# Patient Record
Sex: FEMALE | URBAN - METROPOLITAN AREA
[De-identification: names, ages, dates, MRNs, and addresses within clinical notes are randomized per-mention and may not be internally consistent; named-entity substitution may affect disease eponyms.]

---

## 2017-08-30 ENCOUNTER — INPATIENT (INPATIENT)
Facility: HOSPITAL | Age: 82
LOS: 7 days | Discharge: NURSING, REHAB OR LONG-TERM CARE | End: 2017-09-07
Attending: INTERNAL MEDICINE | Admitting: INTERNAL MEDICINE
Payer: MEDICARE

## 2017-08-30 VITALS
SYSTOLIC BLOOD PRESSURE: 156 MMHG | RESPIRATION RATE: 20 BRPM | DIASTOLIC BLOOD PRESSURE: 69 MMHG | HEART RATE: 103 BPM | TEMPERATURE: 99 F | OXYGEN SATURATION: 99 %

## 2017-08-30 DIAGNOSIS — E03.9 HYPOTHYROIDISM, UNSPECIFIED: ICD-10-CM

## 2017-08-30 DIAGNOSIS — S42.90XA FRACTURE OF UNSPECIFIED SHOULDER GIRDLE, PART UNSPECIFIED, INITIAL ENCOUNTER FOR CLOSED FRACTURE: Chronic | ICD-10-CM

## 2017-08-30 DIAGNOSIS — E89.0 POSTPROCEDURAL HYPOTHYROIDISM: Chronic | ICD-10-CM

## 2017-08-30 DIAGNOSIS — D64.9 ANEMIA, UNSPECIFIED: ICD-10-CM

## 2017-08-30 DIAGNOSIS — K21.9 GASTRO-ESOPHAGEAL REFLUX DISEASE WITHOUT ESOPHAGITIS: ICD-10-CM

## 2017-08-30 DIAGNOSIS — H40.9 UNSPECIFIED GLAUCOMA: ICD-10-CM

## 2017-08-30 DIAGNOSIS — R60.0 LOCALIZED EDEMA: ICD-10-CM

## 2017-08-30 DIAGNOSIS — H26.40 UNSPECIFIED SECONDARY CATARACT: Chronic | ICD-10-CM

## 2017-08-30 DIAGNOSIS — Z29.9 ENCOUNTER FOR PROPHYLACTIC MEASURES, UNSPECIFIED: ICD-10-CM

## 2017-08-30 DIAGNOSIS — E78.5 HYPERLIPIDEMIA, UNSPECIFIED: ICD-10-CM

## 2017-08-30 DIAGNOSIS — I10 ESSENTIAL (PRIMARY) HYPERTENSION: ICD-10-CM

## 2017-08-30 LAB
ABO RH CONFIRMATION: SIGNIFICANT CHANGE UP
ALBUMIN SERPL ELPH-MCNC: 3.4 G/DL — SIGNIFICANT CHANGE UP (ref 3.3–5)
ALP SERPL-CCNC: 91 U/L — SIGNIFICANT CHANGE UP (ref 40–120)
ALT FLD-CCNC: 11 U/L — LOW (ref 12–78)
ANION GAP SERPL CALC-SCNC: 10 MMOL/L — SIGNIFICANT CHANGE UP (ref 5–17)
ANISOCYTOSIS BLD QL: SIGNIFICANT CHANGE UP
APPEARANCE UR: CLEAR — SIGNIFICANT CHANGE UP
APTT BLD: 24.5 SEC — LOW (ref 27.5–37.4)
AST SERPL-CCNC: 17 U/L — SIGNIFICANT CHANGE UP (ref 15–37)
BILIRUB SERPL-MCNC: 1.2 MG/DL — SIGNIFICANT CHANGE UP (ref 0.2–1.2)
BILIRUB UR-MCNC: NEGATIVE — SIGNIFICANT CHANGE UP
BLD GP AB SCN SERPL QL: SIGNIFICANT CHANGE UP
BUN SERPL-MCNC: 12 MG/DL — SIGNIFICANT CHANGE UP (ref 7–23)
CALCIUM SERPL-MCNC: 8.5 MG/DL — SIGNIFICANT CHANGE UP (ref 8.5–10.1)
CHLORIDE SERPL-SCNC: 108 MMOL/L — SIGNIFICANT CHANGE UP (ref 96–108)
CK SERPL-CCNC: 100 U/L — SIGNIFICANT CHANGE UP (ref 26–192)
CO2 SERPL-SCNC: 25 MMOL/L — SIGNIFICANT CHANGE UP (ref 22–31)
COLOR SPEC: YELLOW — SIGNIFICANT CHANGE UP
CREAT SERPL-MCNC: 0.52 MG/DL — SIGNIFICANT CHANGE UP (ref 0.5–1.3)
DACRYOCYTES BLD QL SMEAR: SLIGHT — SIGNIFICANT CHANGE UP
DIFF PNL FLD: NEGATIVE — SIGNIFICANT CHANGE UP
EOSINOPHIL NFR BLD AUTO: 1 % — SIGNIFICANT CHANGE UP (ref 0–6)
GIANT PLATELETS BLD QL SMEAR: PRESENT — SIGNIFICANT CHANGE UP
GLUCOSE SERPL-MCNC: 143 MG/DL — HIGH (ref 70–99)
GLUCOSE UR QL: NEGATIVE MG/DL — SIGNIFICANT CHANGE UP
HCT VFR BLD CALC: 15.7 % — CRITICAL LOW (ref 34.5–45)
HCT VFR BLD CALC: 16.7 % — CRITICAL LOW (ref 34.5–45)
HGB BLD-MCNC: 4.3 G/DL — CRITICAL LOW (ref 11.5–15.5)
HGB BLD-MCNC: 4.6 G/DL — CRITICAL LOW (ref 11.5–15.5)
HYPOCHROMIA BLD QL: SIGNIFICANT CHANGE UP
INR BLD: 1.19 RATIO — HIGH (ref 0.88–1.16)
KETONES UR-MCNC: ABNORMAL
LEUKOCYTE ESTERASE UR-ACNC: NEGATIVE — SIGNIFICANT CHANGE UP
LYMPHOCYTES # BLD AUTO: 5 % — LOW (ref 13–44)
MACROCYTES BLD QL: SLIGHT — SIGNIFICANT CHANGE UP
MAGNESIUM SERPL-MCNC: 2.1 MG/DL — SIGNIFICANT CHANGE UP (ref 1.6–2.6)
MCHC RBC-ENTMCNC: 17 PG — LOW (ref 27–34)
MCHC RBC-ENTMCNC: 17 PG — LOW (ref 27–34)
MCHC RBC-ENTMCNC: 27 GM/DL — LOW (ref 32–36)
MCHC RBC-ENTMCNC: 27.4 GM/DL — LOW (ref 32–36)
MCV RBC AUTO: 62.2 FL — LOW (ref 80–100)
MCV RBC AUTO: 62.7 FL — LOW (ref 80–100)
MICROCYTES BLD QL: SIGNIFICANT CHANGE UP
MONOCYTES NFR BLD AUTO: 3 % — SIGNIFICANT CHANGE UP (ref 2–14)
MYOGLOBIN SERPL-MCNC: 55 NG/ML — SIGNIFICANT CHANGE UP (ref 9–82)
NEUTROPHILS NFR BLD AUTO: 91 % — HIGH (ref 43–77)
NITRITE UR-MCNC: NEGATIVE — SIGNIFICANT CHANGE UP
OB PNL STL: NEGATIVE — SIGNIFICANT CHANGE UP
OVALOCYTES BLD QL SMEAR: SIGNIFICANT CHANGE UP
PH UR: 6 — SIGNIFICANT CHANGE UP (ref 5–8)
PLAT MORPH BLD: NORMAL — SIGNIFICANT CHANGE UP
PLATELET # BLD AUTO: 374 K/UL — SIGNIFICANT CHANGE UP (ref 150–400)
PLATELET # BLD AUTO: 397 K/UL — SIGNIFICANT CHANGE UP (ref 150–400)
POIKILOCYTOSIS BLD QL AUTO: SLIGHT — SIGNIFICANT CHANGE UP
POLYCHROMASIA BLD QL SMEAR: SLIGHT — SIGNIFICANT CHANGE UP
POTASSIUM SERPL-MCNC: 4.2 MMOL/L — SIGNIFICANT CHANGE UP (ref 3.5–5.3)
POTASSIUM SERPL-SCNC: 4.2 MMOL/L — SIGNIFICANT CHANGE UP (ref 3.5–5.3)
PROT SERPL-MCNC: 6.6 GM/DL — SIGNIFICANT CHANGE UP (ref 6–8.3)
PROT UR-MCNC: NEGATIVE MG/DL — SIGNIFICANT CHANGE UP
PROTHROM AB SERPL-ACNC: 13 SEC — HIGH (ref 9.8–12.7)
RBC # BLD: 2.51 M/UL — LOW (ref 3.8–5.2)
RBC # BLD: 2.68 M/UL — LOW (ref 3.8–5.2)
RBC # FLD: 16.1 % — HIGH (ref 11–15)
RBC # FLD: 16.6 % — HIGH (ref 11–15)
RBC BLD AUTO: ABNORMAL
SODIUM SERPL-SCNC: 143 MMOL/L — SIGNIFICANT CHANGE UP (ref 135–145)
SP GR SPEC: 1.02 — SIGNIFICANT CHANGE UP (ref 1.01–1.02)
TARGETS BLD QL SMEAR: SLIGHT — SIGNIFICANT CHANGE UP
UROBILINOGEN FLD QL: NEGATIVE MG/DL — SIGNIFICANT CHANGE UP
WBC # BLD: 10 K/UL — SIGNIFICANT CHANGE UP (ref 3.8–10.5)
WBC # BLD: 9.4 K/UL — SIGNIFICANT CHANGE UP (ref 3.8–10.5)
WBC # FLD AUTO: 10 K/UL — SIGNIFICANT CHANGE UP (ref 3.8–10.5)
WBC # FLD AUTO: 9.4 K/UL — SIGNIFICANT CHANGE UP (ref 3.8–10.5)

## 2017-08-30 PROCEDURE — 73562 X-RAY EXAM OF KNEE 3: CPT | Mod: 26,LT

## 2017-08-30 PROCEDURE — 70450 CT HEAD/BRAIN W/O DYE: CPT | Mod: 26

## 2017-08-30 PROCEDURE — 99223 1ST HOSP IP/OBS HIGH 75: CPT | Mod: AI

## 2017-08-30 PROCEDURE — 73502 X-RAY EXAM HIP UNI 2-3 VIEWS: CPT | Mod: 26,LT

## 2017-08-30 PROCEDURE — 71010: CPT | Mod: 26

## 2017-08-30 PROCEDURE — 99285 EMERGENCY DEPT VISIT HI MDM: CPT

## 2017-08-30 RX ORDER — DOCUSATE SODIUM 100 MG
100 CAPSULE ORAL
Qty: 0 | Refills: 0 | Status: DISCONTINUED | OUTPATIENT
Start: 2017-08-30 | End: 2017-09-07

## 2017-08-30 RX ORDER — AMLODIPINE BESYLATE 2.5 MG/1
10 TABLET ORAL DAILY
Qty: 0 | Refills: 0 | Status: DISCONTINUED | OUTPATIENT
Start: 2017-08-30 | End: 2017-09-07

## 2017-08-30 RX ORDER — ASPIRIN/CALCIUM CARB/MAGNESIUM 324 MG
81 TABLET ORAL DAILY
Qty: 0 | Refills: 0 | Status: DISCONTINUED | OUTPATIENT
Start: 2017-08-30 | End: 2017-09-05

## 2017-08-30 RX ORDER — MAGNESIUM HYDROXIDE 400 MG/1
30 TABLET, CHEWABLE ORAL AT BEDTIME
Qty: 0 | Refills: 0 | Status: DISCONTINUED | OUTPATIENT
Start: 2017-08-30 | End: 2017-09-07

## 2017-08-30 RX ORDER — ACETAMINOPHEN 500 MG
650 TABLET ORAL EVERY 6 HOURS
Qty: 0 | Refills: 0 | Status: DISCONTINUED | OUTPATIENT
Start: 2017-08-30 | End: 2017-09-07

## 2017-08-30 RX ORDER — PANTOPRAZOLE SODIUM 20 MG/1
40 TABLET, DELAYED RELEASE ORAL
Qty: 0 | Refills: 0 | Status: DISCONTINUED | OUTPATIENT
Start: 2017-08-30 | End: 2017-08-31

## 2017-08-30 RX ORDER — ACETAMINOPHEN 500 MG
975 TABLET ORAL ONCE
Qty: 0 | Refills: 0 | Status: COMPLETED | OUTPATIENT
Start: 2017-08-30 | End: 2017-08-30

## 2017-08-30 RX ORDER — SODIUM CHLORIDE 9 MG/ML
1000 INJECTION INTRAMUSCULAR; INTRAVENOUS; SUBCUTANEOUS
Qty: 0 | Refills: 0 | Status: DISCONTINUED | OUTPATIENT
Start: 2017-08-30 | End: 2017-09-02

## 2017-08-30 RX ORDER — FERROUS SULFATE 325(65) MG
325 TABLET ORAL
Qty: 0 | Refills: 0 | Status: DISCONTINUED | OUTPATIENT
Start: 2017-08-30 | End: 2017-09-07

## 2017-08-30 RX ORDER — SENNA PLUS 8.6 MG/1
2 TABLET ORAL AT BEDTIME
Qty: 0 | Refills: 0 | Status: DISCONTINUED | OUTPATIENT
Start: 2017-08-30 | End: 2017-09-07

## 2017-08-30 RX ORDER — DORZOLAMIDE HYDROCHLORIDE TIMOLOL MALEATE 20; 5 MG/ML; MG/ML
1 SOLUTION/ DROPS OPHTHALMIC
Qty: 0 | Refills: 0 | Status: DISCONTINUED | OUTPATIENT
Start: 2017-08-30 | End: 2017-09-07

## 2017-08-30 RX ORDER — CHOLECALCIFEROL (VITAMIN D3) 125 MCG
2000 CAPSULE ORAL DAILY
Qty: 0 | Refills: 0 | Status: DISCONTINUED | OUTPATIENT
Start: 2017-08-30 | End: 2017-09-07

## 2017-08-30 RX ORDER — ATORVASTATIN CALCIUM 80 MG/1
10 TABLET, FILM COATED ORAL AT BEDTIME
Qty: 0 | Refills: 0 | Status: DISCONTINUED | OUTPATIENT
Start: 2017-08-30 | End: 2017-09-07

## 2017-08-30 RX ORDER — LEVOTHYROXINE SODIUM 125 MCG
88 TABLET ORAL DAILY
Qty: 0 | Refills: 0 | Status: DISCONTINUED | OUTPATIENT
Start: 2017-08-30 | End: 2017-09-07

## 2017-08-30 RX ADMIN — SENNA PLUS 2 TABLET(S): 8.6 TABLET ORAL at 22:19

## 2017-08-30 RX ADMIN — ATORVASTATIN CALCIUM 10 MILLIGRAM(S): 80 TABLET, FILM COATED ORAL at 22:18

## 2017-08-30 RX ADMIN — Medication 975 MILLIGRAM(S): at 10:47

## 2017-08-30 RX ADMIN — Medication 650 MILLIGRAM(S): at 18:01

## 2017-08-30 NOTE — ED PROVIDER NOTE - MEDICAL DECISION MAKING DETAILS
Patient pw fall and down time of approx 10 hours. Patient pw fall and down time of approx 10 hours. ekg shows nsr without ischemic change. Patient pw fall and down time of approx 10 hours. ekg shows nsr without ischemic change. xrays negative for fx of the lle. ct reassuring. cbc shows anemia, will need transfusion of 2 units. unclear where the blood is going given neg hemoccult.

## 2017-08-30 NOTE — H&P ADULT - ATTENDING COMMENTS
agree with h/p, a/p, Pt p/w s/p fall, lying on floor over night, CPK normal, but with low h/h, fobt x 1 negative in ed, ordered for 2 PRBC by ED, will get GI consult/Dr. Fletcher, and doppler LE to r/o DVT.

## 2017-08-30 NOTE — ED PROVIDER NOTE - OBJECTIVE STATEMENT
Pertinent PMH/PSH/FHx/SHx and Review of Systems contained within:  89F hx.... pw left knee pain. last night at approximately 10pm patient fell while reaching for her walker. she hit her left knee on the floor but was unable to get up. her aide Pertinent PMH/PSH/FHx/SHx and Review of Systems contained within:  89F hx.... pw left knee pain. last night at approximately 10pm patient fell while reaching for her walker. she hit her left knee on the floor but was unable to get up. her aide found her this morning at 8am and called 911. patient denies ha, cp, sob  Fh and Sh not otherwise contributory  ROS otherwise negative

## 2017-08-30 NOTE — ED ADULT NURSE NOTE - OBJECTIVE STATEMENT
Patient received alert and orientedx4, hard of hearing , states she fell last night while trying to get up. c/o of pain to the left knee, seen swollen and boggy

## 2017-08-30 NOTE — H&P ADULT - NSHPPHYSICALEXAM_GEN_ALL_CORE
heent ncat   eomi pulils 3mm non reactive  no jvd no bruit  lungs clear  cvs -s1s2 rr no mrg  abd soft bs+ nt nd   ext lue rle strenght reduced   lle tender to palpation  lle wider than rle   lle edema 1+ pitting   dp pt positive b/l

## 2017-08-30 NOTE — ED ADULT TRIAGE NOTE - CHIEF COMPLAINT QUOTE
pt status post fall last night. unable to get her self off of the floor. pt complaining of left knee pain.

## 2017-08-30 NOTE — H&P ADULT - PMH
Anemia due to unknown mechanism    Glaucoma    HLD (hyperlipidemia)    HTN (hypertension)    HTN (hypertension)    Hypothyroid

## 2017-08-30 NOTE — ED PROVIDER NOTE - PHYSICAL EXAMINATION
Gen: Alert, NAD  Head: NC, AT   Eyes: PERRL, EOMI, normal lids/conjunctiva  ENT: normal hearing, patent oropharynx without erythema/exudate, uvula midline  Neck: supple, no tenderness, Trachea midline  Pulm: Bilateral BS, normal resp effort, no wheeze/stridor/retractions  CV: RRR, no M/R/G, 2+ radial and dp pulses bl, no edema  Abd: soft, NT/ND, +BS, no hepatosplenomegaly  Mskel: left knee joint tissue effusion.   Skin: no rash, no bruising   Neuro: AAOx3, no sensory/motor deficits, CN 2-12 intact

## 2017-08-30 NOTE — H&P ADULT - HISTORY OF PRESENT ILLNESS
89 yo female lives alone hha 7 days 10 hours / pmh of falls / anemia microcytic / htn / hld / glaucoma hypothyroidism poor historian. pt was found on floor in am by a, as she was alone and is a poor historian events leading up to her being on floor unknown .pt c/o l eft knee pain lle weakness swelling ongoing b/l knee pain ros np cough ,no dysuria , no sore throat , occasional palpitation's , + tinnitus , no fever no abd pain no nausea no vomiting. head ct negative l knee film severe djd , cxr interstitial lung changes no opacity noted , hp pelvis film no fracture . er labs signigificant for hgb hct 4.3/15.7 pt receiving 2 units in er

## 2017-08-30 NOTE — H&P ADULT - NSHPREVIEWOFSYSTEMS_GEN_ALL_CORE
no dizziness, no n/v/d , no sore throat , no dysuria, no brbpr ,   +tinnitus , + palpations ,  b/l knee pain l>r left , l shoulder pain .
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
## 2017-08-31 LAB
ANION GAP SERPL CALC-SCNC: 8 MMOL/L — SIGNIFICANT CHANGE UP (ref 5–17)
BUN SERPL-MCNC: 9 MG/DL — SIGNIFICANT CHANGE UP (ref 7–23)
CALCIUM SERPL-MCNC: 8 MG/DL — LOW (ref 8.5–10.1)
CHLORIDE SERPL-SCNC: 105 MMOL/L — SIGNIFICANT CHANGE UP (ref 96–108)
CO2 SERPL-SCNC: 27 MMOL/L — SIGNIFICANT CHANGE UP (ref 22–31)
CREAT SERPL-MCNC: 0.46 MG/DL — LOW (ref 0.5–1.3)
CULTURE RESULTS: NO GROWTH — SIGNIFICANT CHANGE UP
GLUCOSE SERPL-MCNC: 99 MG/DL — SIGNIFICANT CHANGE UP (ref 70–99)
HCT VFR BLD CALC: 23.3 % — LOW (ref 34.5–45)
HGB BLD-MCNC: 7.3 G/DL — LOW (ref 11.5–15.5)
MCHC RBC-ENTMCNC: 21.1 PG — LOW (ref 27–34)
MCHC RBC-ENTMCNC: 31.4 GM/DL — LOW (ref 32–36)
MCV RBC AUTO: 67.4 FL — LOW (ref 80–100)
PLATELET # BLD AUTO: 347 K/UL — SIGNIFICANT CHANGE UP (ref 150–400)
POTASSIUM SERPL-MCNC: 3.9 MMOL/L — SIGNIFICANT CHANGE UP (ref 3.5–5.3)
POTASSIUM SERPL-SCNC: 3.9 MMOL/L — SIGNIFICANT CHANGE UP (ref 3.5–5.3)
RBC # BLD: 3.46 M/UL — LOW (ref 3.8–5.2)
RBC # FLD: 22 % — HIGH (ref 11–15)
SODIUM SERPL-SCNC: 140 MMOL/L — SIGNIFICANT CHANGE UP (ref 135–145)
SPECIMEN SOURCE: SIGNIFICANT CHANGE UP
TSH SERPL-MCNC: 2.86 UU/ML — SIGNIFICANT CHANGE UP (ref 0.36–3.74)
WBC # BLD: 15.1 K/UL — HIGH (ref 3.8–10.5)
WBC # FLD AUTO: 15.1 K/UL — HIGH (ref 3.8–10.5)

## 2017-08-31 PROCEDURE — 99233 SBSQ HOSP IP/OBS HIGH 50: CPT

## 2017-08-31 PROCEDURE — 76377 3D RENDER W/INTRP POSTPROCES: CPT | Mod: 26

## 2017-08-31 PROCEDURE — 73700 CT LOWER EXTREMITY W/O DYE: CPT | Mod: 26,LT

## 2017-08-31 RX ORDER — PANTOPRAZOLE SODIUM 20 MG/1
40 TABLET, DELAYED RELEASE ORAL
Qty: 0 | Refills: 0 | Status: DISCONTINUED | OUTPATIENT
Start: 2017-08-31 | End: 2017-09-07

## 2017-08-31 RX ORDER — ACETAMINOPHEN 500 MG
650 TABLET ORAL EVERY 6 HOURS
Qty: 0 | Refills: 0 | Status: DISCONTINUED | OUTPATIENT
Start: 2017-08-31 | End: 2017-09-07

## 2017-08-31 RX ADMIN — DORZOLAMIDE HYDROCHLORIDE TIMOLOL MALEATE 1 DROP(S): 20; 5 SOLUTION/ DROPS OPHTHALMIC at 18:31

## 2017-08-31 RX ADMIN — Medication 325 MILLIGRAM(S): at 11:30

## 2017-08-31 RX ADMIN — DORZOLAMIDE HYDROCHLORIDE TIMOLOL MALEATE 1 DROP(S): 20; 5 SOLUTION/ DROPS OPHTHALMIC at 06:59

## 2017-08-31 RX ADMIN — Medication 2000 UNIT(S): at 11:30

## 2017-08-31 RX ADMIN — SENNA PLUS 2 TABLET(S): 8.6 TABLET ORAL at 21:58

## 2017-08-31 RX ADMIN — Medication 100 MILLIGRAM(S): at 18:31

## 2017-08-31 RX ADMIN — ATORVASTATIN CALCIUM 10 MILLIGRAM(S): 80 TABLET, FILM COATED ORAL at 21:58

## 2017-08-31 RX ADMIN — Medication 325 MILLIGRAM(S): at 18:31

## 2017-08-31 RX ADMIN — Medication 81 MILLIGRAM(S): at 11:30

## 2017-08-31 RX ADMIN — Medication 650 MILLIGRAM(S): at 04:07

## 2017-08-31 NOTE — PROGRESS NOTE ADULT - ASSESSMENT
89 y/o female lives alone at home with A  7 days x 10 hours with PMH of falls, HTN, HLD, Glaucoma, Hypothyroidism, dementia  was brought in when HHA found him on the floor in am, Pt poor historian and states that she fell from bed and was on floor all night, but also had bed sheet and pillow on the floor as per roberto, CPK was normal,  Pt also c/o left knee pain, LLE weakness and ongoing swelling ongoing, also with  b/l knee pain (chronic as per daughter left >right), Pt denies being on any NSAIDs  In ED w/u c/w severe iron def/microcytic anemia, cpk wnl, CTH negative for intracranial pathology.

## 2017-08-31 NOTE — PROGRESS NOTE ADULT - SUBJECTIVE AND OBJECTIVE BOX
CHIEF COMPLAINT/INTERVAL HISTORY:    Patient is a 89y old  Female who presents with a chief complaint of s/p unwitnessed fall (30 Aug 2017 16:34)      HPI:  89 yo female lives alone hha 7 days 10 hours / pmh of falls / anemia microcytic / htn / hld / glaucoma hypothyroidism poor historian. pt was found on floor in am by hha, as she was alone and is a poor historian events leading up to her being on floor unknown .pt c/o l eft knee pain lle weakness swelling ongoing b/l knee pain ros np cough ,no dysuria , no sore throat , occasional palpitation's , + tinnitus , no fever no abd pain no nausea no vomiting. head ct negative l knee film severe djd , cxr interstitial lung changes no opacity noted , hp pelvis film no fracture . er labs signigificant for hgb hct 4.3/15.7 pt receiving 2 units in er (30 Aug 2017 16:34)      SUBJECTIVE & OBJECTIVE: Pt seen and examined at bedside.   Denies chest pain/SOB, nausea/vomiting/diarrhea, No cough, dizziness, HA or blurry vision, all other ROS negative.  c/o left knee pain/chronic pain as per daughter.  Pt denies taking nsaids, but poor historian with some confusion.     Vital Signs Last 24 Hrs  T(C): 37.7 (31 Aug 2017 11:48), Max: 38.8 (31 Aug 2017 00:25)  T(F): 99.8 (31 Aug 2017 11:48), Max: 101.8 (31 Aug 2017 00:25)  HR: 93 (31 Aug 2017 11:48) (90 - 108)  BP: 124/60 (31 Aug 2017 11:48) (106/37 - 155/63)  BP(mean): --  ABP: --  ABP(mean): --  RR: 18 (31 Aug 2017 11:48) (16 - 19)  SpO2: 100% (31 Aug 2017 11:48) (98% - 100%)        MEDICATIONS  (STANDING):  sodium chloride 0.9%. 1000 milliLiter(s) (100 mL/Hr) IV Continuous <Continuous>  aspirin enteric coated 81 milliGRAM(s) Oral daily  atorvastatin 10 milliGRAM(s) Oral at bedtime  amLODIPine   Tablet 10 milliGRAM(s) Oral daily  dorzolamide 2%/timolol 0.5% Ophthalmic Solution 1 Drop(s) Both EYES two times a day  levothyroxine 88 MICROGram(s) Oral daily  multivitamin 1 Tablet(s) Oral daily  cholecalciferol 2000 Unit(s) Oral daily  ferrous    sulfate 325 milliGRAM(s) Oral two times a day with meals  docusate sodium 100 milliGRAM(s) Oral two times a day  senna 2 Tablet(s) Oral at bedtime  pantoprazole    Tablet 40 milliGRAM(s) Oral before breakfast    MEDICATIONS  (PRN):  acetaminophen   Tablet. 650 milliGRAM(s) Oral every 6 hours PRN Mild Pain (1 - 3)  magnesium hydroxide Suspension 30 milliLiter(s) Oral at bedtime PRN Constipation  acetaminophen   Tablet 650 milliGRAM(s) Oral every 6 hours PRN For Temp greater than 38.5 C (101.3 F)        PHYSICAL EXAM:    GENERAL: NAD, well-developed  HEAD:  Atraumatic, Normocephalic  EYES: EOMI, PERRLA, conjunctiva and sclera clear, ++ pallor  ENMT: Moist mucous membranes  NECK: Supple, No JVD  NERVOUS SYSTEM:  Alert & Oriented X 2 with forgetfulness, off and on confusion,  Motor Strength 4/5 B/L upper and lower extremities;   CHEST/LUNG: Clear to auscultation bilaterally; No rales, rhonchi, wheezing, or rubs  HEART: Regular rate and rhythm;   ABDOMEN: Soft, Nontender, Nondistended; Bowel sounds present  EXTREMITIES: no cyanosis, left thigh edema.    LABS:                        4.3    10.0  )-----------( 374      ( 30 Aug 2017 12:14 )             15.7         143  |  108  |  12  ----------------------------<  143<H>  4.2   |  25  |  0.52    Ca    8.5      30 Aug 2017 11:06  Mg     2.1         TPro  6.6  /  Alb  3.4  /  TBili  1.2  /  DBili  x   /  AST  17  /  ALT  11<L>  /  AlkPhos  91      PT/INR - ( 30 Aug 2017 11:06 )   PT: 13.0 sec;   INR: 1.19 ratio         PTT - ( 30 Aug 2017 11:06 )  PTT:24.5 sec  Urinalysis Basic - ( 30 Aug 2017 12:14 )    Color: Yellow / Appearance: Clear / S.020 / pH: x  Gluc: x / Ketone: Small  / Bili: Negative / Urobili: Negative mg/dL   Blood: x / Protein: Negative mg/dL / Nitrite: Negative   Leuk Esterase: Negative / RBC: x / WBC x   Sq Epi: x / Non Sq Epi: x / Bacteria: x

## 2017-08-31 NOTE — CONSULT NOTE ADULT - ASSESSMENT
HPI:  89 yo female lives alone a 7 days 10 hours / pmh of falls / anemia microcytic / htn / hld / glaucoma hypothyroidism poor historian. pt was found on floor in am by a, as she was alone and is a poor historian events leading up to her being on floor unknown .pt c/o l eft knee pain lle weakness swelling ongoing b/l knee pain ros np cough ,no dysuria , no sore throat , occasional palpitation's , + tinnitus , no fever no abd pain no nausea no vomiting. head ct negative l knee film severe djd , cxr interstitial lung changes no opacity noted , hp pelvis film no fracture . er labs signigificant for hgb hct 4.3/15.7 pt receiving 2 units in er (30 Aug 2017 16:34)    The patient is a poor historian. The patient denies any history of anemia. She is unsure if she had a colonoscopy. The patients hemoglobin was approximately 4 with iron deficiency indices. The stool for hemoccult was negative. The patient denies melena, hematochezia, hematemesis, nausea, vomiting, abdominal pain, constipation, diarrhea, or change in bowel movements     Iron deficiency anemia without any recent GI bleed ( Heme (-) stool)  1) transfuse  PRBC   2) f/u cbc  3) GI w/u  4) called 1 [phone] but it just rang

## 2017-08-31 NOTE — PROGRESS NOTE ADULT - PROBLEM SELECTOR PLAN 3
transfuse 2 prbc  iron studies c/w iron def anemia ?blood loss source  GI chava/Dr. Chance larsen noted.  f/u post transfusion cbc  will possibly need  gi w/u (fobt in ed negative)

## 2017-08-31 NOTE — CONSULT NOTE ADULT - SUBJECTIVE AND OBJECTIVE BOX
HPI:  89 yo female lives alone a 7 days 10 hours / pmh of falls / anemia microcytic / htn / hld / glaucoma hypothyroidism poor historian. pt was found on floor in am by [facility], as she was alone and is a poor historian events leading up to her being on floor unknown .pt c/o l eft knee pain lle weakness swelling ongoing b/l knee pain ros np cough ,no dysuria , no sore throat , occasional palpitation's , + tinnitus , no fever no abd pain no nausea no vomiting. head ct negative l knee film severe djd , cxr interstitial lung changes no opacity noted , hp pelvis film no fracture . er labs signigificant for hgb hct 4.3/15.7 pt receiving 2 units in er (30 Aug 2017 16:34)    The patient is a poor historian. The patient denies any history of anemia. She is unsure if she had a colonoscopy. The patients hemglobin was approximately 4 with iron deficiency indices. The stool for hemoccult was negative. The patient denies melena, hematochezia, hematemesis, nausea, vomiting, abdominal pain, constipation, diarrhea, or change in bowel movements       PAST MEDICAL & SURGICAL HISTORY:  Anemia due to unknown mechanism  HTN (hypertension)  HLD (hyperlipidemia)  Glaucoma  HTN (hypertension)  Hypothyroid  After cataract, bilateral  H/O thyroidectomy  Fracture of shoulder      MEDICATIONS  (STANDING):  sodium chloride 0.9%. 1000 milliLiter(s) (100 mL/Hr) IV Continuous <Continuous>  aspirin enteric coated 81 milliGRAM(s) Oral daily  atorvastatin 10 milliGRAM(s) Oral at bedtime  amLODIPine   Tablet 10 milliGRAM(s) Oral daily  dorzolamide 2%/timolol 0.5% Ophthalmic Solution 1 Drop(s) Both EYES two times a day  levothyroxine 88 MICROGram(s) Oral daily  multivitamin 1 Tablet(s) Oral daily  cholecalciferol 2000 Unit(s) Oral daily  ferrous    sulfate 325 milliGRAM(s) Oral two times a day with meals  docusate sodium 100 milliGRAM(s) Oral two times a day  senna 2 Tablet(s) Oral at bedtime  pantoprazole    Tablet 40 milliGRAM(s) Oral before breakfast    MEDICATIONS  (PRN):  acetaminophen   Tablet. 650 milliGRAM(s) Oral every 6 hours PRN Mild Pain (1 - 3)  magnesium hydroxide Suspension 30 milliLiter(s) Oral at bedtime PRN Constipation  acetaminophen   Tablet 650 milliGRAM(s) Oral every 6 hours PRN For Temp greater than 38.5 C (101.3 F)      Allergies    sulfa drugs (Other)    Intolerances        FAMILY HISTORY:  No pertinent family history in first degree relatives      REVIEW OF SYSTEMS:    CONSTITUTIONAL: No fever, weight loss  EYES: No eye pain, visual disturbances, or discharge  ENMT:  No tinnitus, vertigo; No sinus or throat pain  NECK: No pain or stiffness  BREASTS: No pain, masses, or nipple discharge  RESPIRATORY: No cough, wheezing, chills or hemoptysis; No shortness of breath  CARDIOVASCULAR: No chest pain, palpitations, dizziness, or leg swelling  GASTROINTESTINAL: See above .  GENITOURINARY: No dysuria, frequency, hematuria, or incontinence  NEUROLOGICAL: No headaches, memory loss, loss of strength, numbness, or tremors  SKIN: No itching, burning, rashes, or lesions   LYMPH NODES: No enlarged glands  ENDOCRINE: No heat or cold intolerance; No hair loss  MUSCULOSKELETAL: No joint pain or swelling; No muscle, back, or extremity pain  PSYCHIATRIC: No depression, anxiety, mood swings, or difficulty sleeping  HEME/LYMPH: No easy bruising, or bleeding gums  ALLERGY AND IMMUNOLOGIC: No hives or eczema          SOCIAL HISTORY:    FAMILY HISTORY:  No pertinent family history in first degree relatives      Vital Signs Last 24 Hrs  T(C): 37.7 (31 Aug 2017 11:48), Max: 38.8 (31 Aug 2017 00:25)  T(F): 99.8 (31 Aug 2017 11:48), Max: 101.8 (31 Aug 2017 00:25)  HR: 93 (31 Aug 2017 11:48) (90 - 108)  BP: 124/60 (31 Aug 2017 11:48) (106/37 - 155/63)  BP(mean): --  RR: 18 (31 Aug 2017 11:48) (16 - 19)  SpO2: 100% (31 Aug 2017 11:48) (98% - 100%)    PHYSICAL EXAM:    GENERAL: NAD, well-groomed, well-developed  HEAD:  Atraumatic, Normocephalic  EYES: EOMI, PERRLA, conjunctiva and sclera clear  NECK: Supple, No JVD, Normal thyroid  NERVOUS SYSTEM:  Alert & Oriented X1  CHEST/LUNG: Clear to percussion bilaterally; No rales, rhonchi, wheezing, or rubs  HEART: Regular rate and rhythm; No murmurs, rubs, or gallops  ABDOMEN: Soft, Nontender, Nondistended; Bowel sounds present  EXTREMITIES:  2+ Peripheral Pulses, No clubbing, cyanosis, or edema  LYMPH: No lymphadenopathy noted   RECTAL: Deferred  SKIN: No rashes or lesions    LABS:                        4.3    10.0  )-----------( 374      ( 30 Aug 2017 12:14 )             15.7       CBC:   @ 12:14  WBC  10.0  HGB 4.3  HCT 15.7 Plate 374  MCV 62.7   @ 11:06  WBC  9.4  HGB 4.6  HCT 16.7 Plate 397  MCV 62.2           30 Aug 2017 11:06    143    |  108    |  12     ----------------------------<  143    4.2     |  25     |  0.52     Ca    8.5        30 Aug 2017 11:06  Mg     2.1       30 Aug 2017 11:06    TPro  6.6    /  Alb  3.4    /  TBili  1.2    /  DBili  x      /  AST  17     /  ALT  11     /  AlkPhos  91     30 Aug 2017 11:06    PT/INR - ( 30 Aug 2017 11:06 )   PT: 13.0 sec;   INR: 1.19 ratio         PTT - ( 30 Aug 2017 11:06 )  PTT:24.5 sec  Urinalysis Basic - ( 30 Aug 2017 12:14 )    Color: Yellow / Appearance: Clear / S.020 / pH: x  Gluc: x / Ketone: Small  / Bili: Negative / Urobili: Negative mg/dL   Blood: x / Protein: Negative mg/dL / Nitrite: Negative   Leuk Esterase: Negative / RBC: x / WBC x   Sq Epi: x / Non Sq Epi: x / Bacteria: x          RADIOLOGY & ADDITIONAL STUDIES:

## 2017-09-01 DIAGNOSIS — D50.9 IRON DEFICIENCY ANEMIA, UNSPECIFIED: ICD-10-CM

## 2017-09-01 LAB
CHOLEST SERPL-MCNC: 119 MG/DL — SIGNIFICANT CHANGE UP (ref 10–199)
HDLC SERPL-MCNC: 59 MG/DL — SIGNIFICANT CHANGE UP (ref 40–125)
LIPID PNL WITH DIRECT LDL SERPL: 49 MG/DL — SIGNIFICANT CHANGE UP
TOTAL CHOLESTEROL/HDL RATIO MEASUREMENT: 2 RATIO — LOW (ref 3.3–7.1)
TRIGL SERPL-MCNC: 57 MG/DL — SIGNIFICANT CHANGE UP (ref 10–149)

## 2017-09-01 PROCEDURE — 93971 EXTREMITY STUDY: CPT | Mod: 26

## 2017-09-01 PROCEDURE — 99233 SBSQ HOSP IP/OBS HIGH 50: CPT

## 2017-09-01 RX ADMIN — Medication 2000 UNIT(S): at 11:38

## 2017-09-01 RX ADMIN — DORZOLAMIDE HYDROCHLORIDE TIMOLOL MALEATE 1 DROP(S): 20; 5 SOLUTION/ DROPS OPHTHALMIC at 17:58

## 2017-09-01 RX ADMIN — Medication 81 MILLIGRAM(S): at 11:38

## 2017-09-01 RX ADMIN — SENNA PLUS 2 TABLET(S): 8.6 TABLET ORAL at 21:56

## 2017-09-01 RX ADMIN — Medication 100 MILLIGRAM(S): at 17:58

## 2017-09-01 RX ADMIN — ATORVASTATIN CALCIUM 10 MILLIGRAM(S): 80 TABLET, FILM COATED ORAL at 21:55

## 2017-09-01 RX ADMIN — Medication 325 MILLIGRAM(S): at 17:58

## 2017-09-01 RX ADMIN — Medication 1 TABLET(S): at 11:38

## 2017-09-01 NOTE — PROGRESS NOTE ADULT - SUBJECTIVE AND OBJECTIVE BOX
Patient is a 89y old  Female who presents with a chief complaint of s/p unwitnessed fall (30 Aug 2017 16:34)      HPI:  89 yo female lives alone a 7 days 10 hours / pmh of falls / anemia microcytic / htn / hld / glaucoma hypothyroidism poor historian. pt was found on floor in am by hha, as she was alone and is a poor historian events leading up to her being on floor unknown .pt c/o l eft knee pain lle weakness swelling ongoing b/l knee pain ros np cough ,no dysuria , no sore throat , occasional palpitation's , + tinnitus , no fever no abd pain no nausea no vomiting. head ct negative l knee film severe djd , cxr interstitial lung changes no opacity noted , hp pelvis film no fracture . er labs signigificant for hgb hct 4.3/15.7 pt receiving 2 units in er (30 Aug 2017 16:34)      INTERVAL HPI/OVERNIGHT EVENTS:   The patient ( and nurse ) denie melena, hematochezia, hematemesis, nausea, vomiting, abdominal pain, constipation, diarrhea, or change in bowel movements. Hgb 7.3 after 2 units PRBC    MEDICATIONS  (STANDING):  sodium chloride 0.9%. 1000 milliLiter(s) (100 mL/Hr) IV Continuous <Continuous>  aspirin enteric coated 81 milliGRAM(s) Oral daily  atorvastatin 10 milliGRAM(s) Oral at bedtime  amLODIPine   Tablet 10 milliGRAM(s) Oral daily  dorzolamide 2%/timolol 0.5% Ophthalmic Solution 1 Drop(s) Both EYES two times a day  levothyroxine 88 MICROGram(s) Oral daily  multivitamin 1 Tablet(s) Oral daily  cholecalciferol 2000 Unit(s) Oral daily  ferrous    sulfate 325 milliGRAM(s) Oral two times a day with meals  docusate sodium 100 milliGRAM(s) Oral two times a day  senna 2 Tablet(s) Oral at bedtime  pantoprazole    Tablet 40 milliGRAM(s) Oral before breakfast    MEDICATIONS  (PRN):  acetaminophen   Tablet. 650 milliGRAM(s) Oral every 6 hours PRN Mild Pain (1 - 3)  magnesium hydroxide Suspension 30 milliLiter(s) Oral at bedtime PRN Constipation  acetaminophen   Tablet 650 milliGRAM(s) Oral every 6 hours PRN For Temp greater than 38.5 C (101.3 F)      FAMILY HISTORY:  No pertinent family history in first degree relatives      Allergies    sulfa drugs (Other)    Intolerances        PMH/PSH:  Anemia due to unknown mechanism  HTN (hypertension)  HLD (hyperlipidemia)  Glaucoma  HTN (hypertension)  Hypothyroid  After cataract, bilateral  H/O thyroidectomy  Fracture of shoulder        REVIEW OF SYSTEMS:  Confused, tired    CONSTITUTIONAL: No fever, weight loss, or fatigue  EYES: No eye pain, visual disturbances, or discharge  NECK: No pain or stiffness  BREASTS: No pain, masses, or nipple discharge  RESPIRATORY: No cough, wheezing, chills or hemoptysis; No shortness of breath  CARDIOVASCULAR: No chest pain, palpitations, dizziness, or leg swelling  GASTROINTESTINAL: See above  GENITOURINARY: No dysuria, frequency, hematuria, or incontinence  NEUROLOGICAL: No headaches, memory loss, loss of strength, numbness, or tremors  SKIN: No itching, burning, rashes, or lesions   LYMPH NODES: No enlarged glands  ENDOCRINE: No heat or cold intolerance; No hair loss  MUSCULOSKELETAL: No joint pain or swelling; No muscle, back, or extremity pain  PSYCHIATRIC: No depression, anxiety, mood swings, or difficulty sleeping  HEME/LYMPH: No easy bruising, or bleeding gums  ALLERGY AND IMMUNOLOGIC: No hives or eczema    Vital Signs Last 24 Hrs  T(C): 37 (01 Sep 2017 05:09), Max: 37.7 (31 Aug 2017 09:48)  T(F): 98.6 (01 Sep 2017 05:09), Max: 99.8 (31 Aug 2017 09:48)  HR: 88 (01 Sep 2017 05:09) (84 - 104)  BP: 128/52 (01 Sep 2017 05:09) (115/45 - 155/63)  BP(mean): --  RR: 18 (01 Sep 2017 05:09) (16 - 18)  SpO2: 98% (01 Sep 2017 05:09) (98% - 100%)    PHYSICAL EXAM:  GENERAL: NAD, well-groomed, well-developed  HEAD:  Atraumatic, Normocephalic  EYES: EOMI, PERRLA, conjunctiva and sclera clear  NECK: Supple, No JVD, Normal thyroid  NERVOUS SYSTEM:  Alert but confused  CHEST/LUNG: Clear to percussion bilaterally; No rales, rhonchi, wheezing, or rubs  HEART: Regular rate and rhythm; No murmurs, rubs, or gallops  ABDOMEN: Soft, Nontender, Nondistended; Bowel sounds present  EXTREMITIES:  2+ Peripheral Pulses, No clubbing, cyanosis, or edema  LYMPH: No lymphadenopathy noted  SKIN: No rashes or lesions    LAB                          7.3    15.1  )-----------( 347      ( 31 Aug 2017 20:54 )             23.3       CBC:   @ 20:54  WBC 15.1   Hgb 7.3   Hct 23.3   Plts 347  MCV 67.4   @ 12:14  WBC 10.0   Hgb 4.3   Hct 15.7   Plts 374  MCV 62.7   @ 11:06  WBC 9.4   Hgb 4.6   Hct 16.7   Plts 397  MCV 62.2      Chemistry:   @ 20:54  Na+ 140  K+ 3.9  Cl- 105  CO2 27  BUN 9  Cr 0.46      @ 11:06  Na+ 143  K+ 4.2  Cl- 108  CO2 25  BUN 12  Cr 0.52         Glucose, Serum: 99 mg/dL ( @ 20:54)  Glucose, Serum: 143 mg/dL ( @ 11:06)      31 Aug 2017 20:54    140    |  105    |  9      ----------------------------<  99     3.9     |  27     |  0.46   30 Aug 2017 11:06    143    |  108    |  12     ----------------------------<  143    4.2     |  25     |  0.52     Ca    8.0        31 Aug 2017 20:54  Ca    8.5        30 Aug 2017 11:06  Mg     2.1       30 Aug 2017 11:06    TPro  6.6    /  Alb  3.4    /  TBili  1.2    /  DBili  x      /  AST  17     /  ALT  11     /  AlkPhos  91     30 Aug 2017 11:06      PT/INR - ( 30 Aug 2017 11:06 )   PT: 13.0 sec;   INR: 1.19 ratio         PTT - ( 30 Aug 2017 11:06 )  PTT:24.5 sec    Urinalysis Basic - ( 30 Aug 2017 12:14 )    Color: Yellow / Appearance: Clear / S.020 / pH: x  Gluc: x / Ketone: Small  / Bili: Negative / Urobili: Negative mg/dL   Blood: x / Protein: Negative mg/dL / Nitrite: Negative   Leuk Esterase: Negative / RBC: x / WBC x   Sq Epi: x / Non Sq Epi: x / Bacteria: x        CAPILLARY BLOOD GLUCOSE              RADIOLOGY & ADDITIONAL TESTS:    Imaging Personally Reviewed:  [ ] YES  [ ] NO    Consultant(s) Notes Reviewed:  [ ] YES  [ ] NO    Care Discussed with Consultants/Other Providers [ ] YES  [ ] NO

## 2017-09-01 NOTE — PROGRESS NOTE ADULT - ASSESSMENT
89 y/o female lives alone at home with HHA  7 days x 10 hours with PMH of falls, HTN, HLD, Glaucoma, Hypothyroidism, dementia  was brought in when HHA found him on the floor in am, Pt poor historian and states that she fell from bed and was on floor all night, but also had bed sheet and pillow on the floor as per roberto, CPK was normal,  Pt also c/o left knee pain, LLE weakness and ongoing swelling ongoing, also with  b/l knee pain (chronic as per daughter left >right), Pt denies being on any NSAIDs  In ED w/u c/w severe iron def/microcytic anemia, cpk wnl, CTH negative for intracranial pathology.    Improved ordered US on family request colonoscopy if  approved by family consider hematology input

## 2017-09-01 NOTE — PROGRESS NOTE ADULT - SUBJECTIVE AND OBJECTIVE BOX
Patient is a 89y old  Female who presents with a chief complaint of s/p unwitnessed fall (30 Aug 2017 16:34)      INTERVAL HPI/OVERNIGHT EVENTS: pleasent no complaints     MEDICATIONS  (STANDING):  sodium chloride 0.9%. 1000 milliLiter(s) (100 mL/Hr) IV Continuous <Continuous>  aspirin enteric coated 81 milliGRAM(s) Oral daily  atorvastatin 10 milliGRAM(s) Oral at bedtime  amLODIPine   Tablet 10 milliGRAM(s) Oral daily  dorzolamide 2%/timolol 0.5% Ophthalmic Solution 1 Drop(s) Both EYES two times a day  levothyroxine 88 MICROGram(s) Oral daily  multivitamin 1 Tablet(s) Oral daily  cholecalciferol 2000 Unit(s) Oral daily  ferrous    sulfate 325 milliGRAM(s) Oral two times a day with meals  docusate sodium 100 milliGRAM(s) Oral two times a day  senna 2 Tablet(s) Oral at bedtime  pantoprazole    Tablet 40 milliGRAM(s) Oral before breakfast    MEDICATIONS  (PRN):  acetaminophen   Tablet. 650 milliGRAM(s) Oral every 6 hours PRN Mild Pain (1 - 3)  magnesium hydroxide Suspension 30 milliLiter(s) Oral at bedtime PRN Constipation  acetaminophen   Tablet 650 milliGRAM(s) Oral every 6 hours PRN For Temp greater than 38.5 C (101.3 F)      Allergies    sulfa drugs (Other)    Intolerances        REVIEW OF SYSTEMS:  CONSTITUTIONAL: No fever, weight loss, or fatigue  EYES: No eye pain, visual disturbances, or discharge  ENMT:  No difficulty hearing, tinnitus, vertigo; No sinus or throat pain  NECK: No pain or stiffness  BREASTS: No pain, masses, or nipple discharge  RESPIRATORY: No cough, wheezing, chills or hemoptysis; No shortness of breath  CARDIOVASCULAR: No chest pain, palpitations, dizziness, or leg swelling  GASTROINTESTINAL: No abdominal or epigastric pain. No nausea, vomiting, or hematemesis; No diarrhea or constipation. No melena or hematochezia.  GENITOURINARY: No dysuria, frequency, hematuria, or incontinence  NEUROLOGICAL: No headaches, memory loss, loss of strength, numbness, or tremors  SKIN: No itching, burning, rashes, or lesions   LYMPH NODES: No enlarged glands  ENDOCRINE: No heat or cold intolerance; No hair loss  MUSCULOSKELETAL: No joint pain or swelling; No muscle, back, or extremity pain  PSYCHIATRIC: No depression, anxiety, mood swings, or difficulty sleeping  HEME/LYMPH: No easy bruising, or bleeding gums  ALLERGY AND IMMUNOLOGIC: No hives or eczema    Vital Signs Last 24 Hrs  T(C): 36.7 (01 Sep 2017 17:30), Max: 37.6 (01 Sep 2017 00:32)  T(F): 98 (01 Sep 2017 17:30), Max: 99.6 (01 Sep 2017 00:32)  HR: 84 (01 Sep 2017 17:30) (84 - 98)  BP: 132/58 (01 Sep 2017 17:30) (115/45 - 140/57)  BP(mean): --  RR: 17 (01 Sep 2017 17:30) (17 - 18)  SpO2: 98% (01 Sep 2017 17:30) (98% - 99%)    PHYSICAL EXAM:  GENERAL: NAD, well-groomed, well-developed  HEAD:  Atraumatic, Normocephalic  EYES: EOMI, PERRLA, conjunctiva and sclera clear  ENMT: No tonsillar erythema, exudates, or enlargement; Moist mucous membranes, Good dentition, No lesions  NECK: Supple, No JVD, Normal thyroid  NERVOUS SYSTEM:  Alert & Oriented X3, Good concentration; Motor Strength 5/5 B/L upper and lower extremities; DTRs 2+ intact and symmetric  CHEST/LUNG: Clear to percussion bilaterally; No rales, rhonchi, wheezing, or rubs  HEART: Regular rate and rhythm; No murmurs, rubs, or gallops  ABDOMEN: Soft, Nontender, Nondistended; Bowel sounds present  EXTREMITIES:  2+ Peripheral Pulses, No clubbing, cyanosis, or edema  LYMPH: No lymphadenopathy noted  SKIN: No rashes or lesions    LABS:                        7.3    15.1  )-----------( 347      ( 31 Aug 2017 20:54 )             23.3     08-31    140  |  105  |  9   ----------------------------<  99  3.9   |  27  |  0.46<L>    Ca    8.0<L>      31 Aug 2017 20:54          CAPILLARY BLOOD GLUCOSE          RADIOLOGY & ADDITIONAL TESTS:    Imaging Personally Reviewed:  [ ] YES  [ ] NO    Consultant(s) Notes Reviewed:  [ ] YES  [ ] NO    Care Discussed with Consultants/Other Providers [ ] YES  [ ] NO

## 2017-09-01 NOTE — PROGRESS NOTE ADULT - ASSESSMENT
HPI:  89 yo female lives alone a 7 days 10 hours / pmh of falls / anemia microcytic / htn / hld / glaucoma hypothyroidism poor historian. pt was found on floor in am by a, as she was alone and is a poor historian events leading up to her being on floor unknown .pt c/o l eft knee pain lle weakness swelling ongoing b/l knee pain ros np cough ,no dysuria , no sore throat , occasional palpitation's , + tinnitus , no fever no abd pain no nausea no vomiting. head ct negative l knee film severe djd , cxr interstitial lung changes no opacity noted , hp pelvis film no fracture . er labs signigificant for hgb hct 4.3/15.7 pt receiving 2 units in er (30 Aug 2017 16:34)    The patient is a poor historian. The patient denies any history of anemia. She is unsure if she had a colonoscopy. The patients hemoglobin was approximately 4 with iron deficiency indices. The stool for hemoccult was negative. The patient denies melena, hematochezia, hematemesis, nausea, vomiting, abdominal pain, constipation, diarrhea, or change in bowel movements     Iron deficiency anemia without any recent GI bleed ( Heme (-) stool)  1) transfuse  PRBC   2) f/u cbc  3) GI w/u  4) called 1 [phone] but it just rang

## 2017-09-02 DIAGNOSIS — S82.92XA UNSPECIFIED FRACTURE OF LEFT LOWER LEG, INITIAL ENCOUNTER FOR CLOSED FRACTURE: ICD-10-CM

## 2017-09-02 LAB
ALBUMIN SERPL ELPH-MCNC: 2.5 G/DL — LOW (ref 3.3–5)
ALP SERPL-CCNC: 78 U/L — SIGNIFICANT CHANGE UP (ref 40–120)
ALT FLD-CCNC: 13 U/L — SIGNIFICANT CHANGE UP (ref 12–78)
ANION GAP SERPL CALC-SCNC: 7 MMOL/L — SIGNIFICANT CHANGE UP (ref 5–17)
AST SERPL-CCNC: 18 U/L — SIGNIFICANT CHANGE UP (ref 15–37)
BILIRUB SERPL-MCNC: 2 MG/DL — HIGH (ref 0.2–1.2)
BUN SERPL-MCNC: 7 MG/DL — SIGNIFICANT CHANGE UP (ref 7–23)
CALCIUM SERPL-MCNC: 7.7 MG/DL — LOW (ref 8.5–10.1)
CHLORIDE SERPL-SCNC: 104 MMOL/L — SIGNIFICANT CHANGE UP (ref 96–108)
CO2 SERPL-SCNC: 29 MMOL/L — SIGNIFICANT CHANGE UP (ref 22–31)
CREAT SERPL-MCNC: 0.48 MG/DL — LOW (ref 0.5–1.3)
GLUCOSE SERPL-MCNC: 84 MG/DL — SIGNIFICANT CHANGE UP (ref 70–99)
HCT VFR BLD CALC: 24.9 % — LOW (ref 34.5–45)
HGB BLD-MCNC: 7.5 G/DL — LOW (ref 11.5–15.5)
MAGNESIUM SERPL-MCNC: 2.2 MG/DL — SIGNIFICANT CHANGE UP (ref 1.6–2.6)
MCHC RBC-ENTMCNC: 21.4 PG — LOW (ref 27–34)
MCHC RBC-ENTMCNC: 30.2 GM/DL — LOW (ref 32–36)
MCV RBC AUTO: 70.8 FL — LOW (ref 80–100)
PHOSPHATE SERPL-MCNC: 2.4 MG/DL — LOW (ref 2.5–4.5)
PLATELET # BLD AUTO: 366 K/UL — SIGNIFICANT CHANGE UP (ref 150–400)
POTASSIUM SERPL-MCNC: 3.9 MMOL/L — SIGNIFICANT CHANGE UP (ref 3.5–5.3)
POTASSIUM SERPL-SCNC: 3.9 MMOL/L — SIGNIFICANT CHANGE UP (ref 3.5–5.3)
PROT SERPL-MCNC: 6 GM/DL — SIGNIFICANT CHANGE UP (ref 6–8.3)
RBC # BLD: 3.52 M/UL — LOW (ref 3.8–5.2)
RBC # FLD: 22.6 % — HIGH (ref 11–15)
SODIUM SERPL-SCNC: 140 MMOL/L — SIGNIFICANT CHANGE UP (ref 135–145)
WBC # BLD: 9.5 K/UL — SIGNIFICANT CHANGE UP (ref 3.8–10.5)
WBC # FLD AUTO: 9.5 K/UL — SIGNIFICANT CHANGE UP (ref 3.8–10.5)

## 2017-09-02 PROCEDURE — 99233 SBSQ HOSP IP/OBS HIGH 50: CPT

## 2017-09-02 PROCEDURE — 76700 US EXAM ABDOM COMPLETE: CPT | Mod: 26

## 2017-09-02 PROCEDURE — 73552 X-RAY EXAM OF FEMUR 2/>: CPT | Mod: 26,LT

## 2017-09-02 RX ORDER — POTASSIUM PHOSPHATE, MONOBASIC POTASSIUM PHOSPHATE, DIBASIC 236; 224 MG/ML; MG/ML
15 INJECTION, SOLUTION INTRAVENOUS ONCE
Qty: 0 | Refills: 0 | Status: COMPLETED | OUTPATIENT
Start: 2017-09-02 | End: 2017-09-02

## 2017-09-02 RX ADMIN — DORZOLAMIDE HYDROCHLORIDE TIMOLOL MALEATE 1 DROP(S): 20; 5 SOLUTION/ DROPS OPHTHALMIC at 06:21

## 2017-09-02 RX ADMIN — Medication 325 MILLIGRAM(S): at 16:52

## 2017-09-02 RX ADMIN — DORZOLAMIDE HYDROCHLORIDE TIMOLOL MALEATE 1 DROP(S): 20; 5 SOLUTION/ DROPS OPHTHALMIC at 16:52

## 2017-09-02 RX ADMIN — Medication 81 MILLIGRAM(S): at 11:56

## 2017-09-02 RX ADMIN — Medication 1 TABLET(S): at 11:56

## 2017-09-02 RX ADMIN — Medication 100 MILLIGRAM(S): at 16:52

## 2017-09-02 RX ADMIN — Medication 325 MILLIGRAM(S): at 09:04

## 2017-09-02 RX ADMIN — POTASSIUM PHOSPHATE, MONOBASIC POTASSIUM PHOSPHATE, DIBASIC 63.75 MILLIMOLE(S): 236; 224 INJECTION, SOLUTION INTRAVENOUS at 16:51

## 2017-09-02 RX ADMIN — Medication 2000 UNIT(S): at 11:56

## 2017-09-02 RX ADMIN — Medication 100 MILLIGRAM(S): at 06:21

## 2017-09-02 RX ADMIN — AMLODIPINE BESYLATE 10 MILLIGRAM(S): 2.5 TABLET ORAL at 06:20

## 2017-09-02 RX ADMIN — PANTOPRAZOLE SODIUM 40 MILLIGRAM(S): 20 TABLET, DELAYED RELEASE ORAL at 06:21

## 2017-09-02 RX ADMIN — ATORVASTATIN CALCIUM 10 MILLIGRAM(S): 80 TABLET, FILM COATED ORAL at 21:25

## 2017-09-02 RX ADMIN — Medication 88 MICROGRAM(S): at 06:21

## 2017-09-02 RX ADMIN — SENNA PLUS 2 TABLET(S): 8.6 TABLET ORAL at 21:25

## 2017-09-02 NOTE — PROGRESS NOTE ADULT - SUBJECTIVE AND OBJECTIVE BOX
Patient is a 89y old  Female who presents with a chief complaint of s/p unwitnessed fall (30 Aug 2017 16:34)      INTERVAL HPI/OVERNIGHT EVENTS:    MEDICATIONS  (STANDING):  aspirin enteric coated 81 milliGRAM(s) Oral daily  atorvastatin 10 milliGRAM(s) Oral at bedtime  amLODIPine   Tablet 10 milliGRAM(s) Oral daily  dorzolamide 2%/timolol 0.5% Ophthalmic Solution 1 Drop(s) Both EYES two times a day  levothyroxine 88 MICROGram(s) Oral daily  multivitamin 1 Tablet(s) Oral daily  cholecalciferol 2000 Unit(s) Oral daily  ferrous    sulfate 325 milliGRAM(s) Oral two times a day with meals  docusate sodium 100 milliGRAM(s) Oral two times a day  senna 2 Tablet(s) Oral at bedtime  pantoprazole    Tablet 40 milliGRAM(s) Oral before breakfast  potassium phosphate IVPB 15 milliMole(s) IV Intermittent once    MEDICATIONS  (PRN):  acetaminophen   Tablet. 650 milliGRAM(s) Oral every 6 hours PRN Mild Pain (1 - 3)  magnesium hydroxide Suspension 30 milliLiter(s) Oral at bedtime PRN Constipation  acetaminophen   Tablet 650 milliGRAM(s) Oral every 6 hours PRN For Temp greater than 38.5 C (101.3 F)      Allergies    sulfa drugs (Other)    Intolerances        REVIEW OF SYSTEMS:  CONSTITUTIONAL: No fever, weight loss, or fatigue  EYES: No eye pain, visual disturbances, or discharge  ENMT:  No difficulty hearing, tinnitus, vertigo; No sinus or throat pain  NECK: No pain or stiffness  BREASTS: No pain, masses, or nipple discharge  RESPIRATORY: No cough, wheezing, chills or hemoptysis; No shortness of breath  CARDIOVASCULAR: No chest pain, palpitations, dizziness, or leg swelling  GASTROINTESTINAL: No abdominal or epigastric pain. No nausea, vomiting, or hematemesis; No diarrhea or constipation. No melena or hematochezia.  GENITOURINARY: No dysuria, frequency, hematuria, or incontinence  NEUROLOGICAL: No headaches, memory loss, loss of strength, numbness, or tremors  SKIN: No itching, burning, rashes, or lesions   LYMPH NODES: No enlarged glands  ENDOCRINE: No heat or cold intolerance; No hair loss  MUSCULOSKELETAL: No joint pain or swelling; No muscle, back, or extremity pain  PSYCHIATRIC: No depression, anxiety, mood swings, or difficulty sleeping  HEME/LYMPH: No easy bruising, or bleeding gums  ALLERGY AND IMMUNOLOGIC: No hives or eczema    Vital Signs Last 24 Hrs  T(C): 37.1 (02 Sep 2017 11:48), Max: 37.4 (02 Sep 2017 05:40)  T(F): 98.8 (02 Sep 2017 11:48), Max: 99.4 (02 Sep 2017 05:40)  HR: 90 (02 Sep 2017 11:48) (84 - 93)  BP: 121/52 (02 Sep 2017 11:48) (119/56 - 137/59)  BP(mean): --  RR: 18 (02 Sep 2017 11:48) (17 - 18)  SpO2: 100% (02 Sep 2017 11:48) (98% - 100%)    PHYSICAL EXAM:  GENERAL: NAD, well-groomed, well-developed  HEAD:  Atraumatic, Normocephalic  EYES: EOMI, PERRLA, conjunctiva and sclera clear  ENMT: No tonsillar erythema, exudates, or enlargement; Moist mucous membranes, Good dentition, No lesions  NECK: Supple, No JVD, Normal thyroid  NERVOUS SYSTEM:  Alert & Oriented X3, Good concentration; Motor Strength 5/5 B/L upper and lower extremities; DTRs 2+ intact and symmetric  CHEST/LUNG: Clear to percussion bilaterally; No rales, rhonchi, wheezing, or rubs  HEART: Regular rate and rhythm; No murmurs, rubs, or gallops  ABDOMEN: Soft, Nontender, Nondistended; Bowel sounds present  EXTREMITIES:  2+ Peripheral Pulses, No clubbing, cyanosis, or edema  LYMPH: No lymphadenopathy noted  SKIN: No rashes or lesions    LABS:                        7.5    9.5   )-----------( 366      ( 02 Sep 2017 07:01 )             24.9     09-02    140  |  104  |  7   ----------------------------<  84  3.9   |  29  |  0.48<L>    Ca    7.7<L>      02 Sep 2017 07:01  Phos  2.4     09-02  Mg     2.2     09-02    TPro  6.0  /  Alb  2.5<L>  /  TBili  2.0<H>  /  DBili  x   /  AST  18  /  ALT  13  /  AlkPhos  78  09-02    RADIOLOGY & ADDITIONAL TESTS:  < from: US Abdomen Complete (09.02.17 @ 09:09) >  FINDINGS:    Liver:  Normal echogenicity and echotexture. No focal mass.  Bile ducts: No intrahepatic or extrahepatic biliary ductal dilatation.   Common bile duct measures 0.3 cm.   Gallbladder:  No calculi. Normal wall thickness.  No pericholecystic   fluid. No tenderness.   Pancreas: Visualized portions are grossly normal.  Spleen: 7.6 x 2.3 x 2.0 cm. Within normal limits.  Right kidney: 11.2 x 5.1 x 3.7 cm. No hydronephrosis.  Left kidney: 10.5 x 4.9 x 4.8 cm. No hydronephrosis.   Ascites: None.    Other: The visualized Aorta and IVC are within normal limits.      EXAM:  CT LWR EXT LT                          [facility]                            PROCEDURE DATE:  08/31/2017          INTERPRETATION:  Clinical indication: Left thigh swelling, left knee pain   status post fall, severe anemia.    Technique: CT axial images of the left thigh were obtained without   intravenous contrast. Coronal and sagittal reformatted images were also   obtained. 3-D volume rendering images were obtained from a separate   workstation.    Comparison: Earlier radiographs of the same date. CT 5/27/2016.    Findings:    Bones: Osteopenia. Acute, vertically oriented fracture of the distal   femoral diaphysis extending inferiorly to the medial aspect of the   lateral trochlea and lateral femoral condyle. Horizontal component across   the medial and lateral epicondyles with mildly displaced fracture   fragments at the lateral epicondyle. Punctate densities in the   suprapatellar bursa, which may represent intra-articular bodies versus   fracture fragments. Mild medial patellar subluxation. No dislocation.   Again noted, cortical and trabecular thickening and mild lateral bowing   of the left proximal femur, suggesting Paget's disease.    Soft tissue: Nonspecific stranding in the deep soft tissue along the   fascial plane and muscle layers as well as superficial soft tissue. No   significant hip joint effusion. Small left knee lipohemarthrosis. Diffuse   muscle bulk loss with fatty replacement. Quadriceps and patellar   enthesopathy.       Miscellaneous: Colon diverticulosis. Calcified fibroids in the partially   visualized uterus. Indeterminate 1.1 x 1.2 cm hypodense focus in the left   adnexa. Horowitz catheter in the partially distended bladder.   Atherosclerotic arterial calcification.     Impression:    Acute left distal femoral diaphyseal fracture with inferior   intra-articular extension as described.    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     Nonspecific stranding or edema pattern in the deep soft tissue along the   fascial plane and muscle layers as well as superficial soft tissue, which   may represent posttraumatic. Recommend clinical correlation to assess   infection/inflammation (myositis/fasciitis/cellulitis).    Horowitz catheter in the partially distended bladder. Recommend clinical   correlation (malfunctioning or clamped catheter).    Small indeterminate left adnexal lesion, which can be further   characterized on a follow-up pelvic ultrasound.    The preliminary report was provided by [facility] shortly   following the examination.          IMPRESSION:    No cholelithiasis or sonographic evidence of acute cholecystitis.      Imaging Personally Reviewed:  [X ] YES  [ ] NO    Consultant(s) Notes Reviewed:  [X ] YES  [ ] NO    Care Discussed with Consultants/Other Providers [X ] YES  [ ] NO

## 2017-09-02 NOTE — CONSULT NOTE ADULT - ASSESSMENT
89F with left intraarticular distal femur fracture    NWB left LE in KI  Will get vincent locked at 10 degrees initially  Pain control  Rest, ice, and elevate affected leg  Discussed signs/symptoms of compartment syndrome  Discussed possible need for surgical fixation given intrarticular fracture - patient and family report wish to manage nonoperatively  No planned orthopedic intervention at this time  Ortho stable for discharge  Follow up in office within 5 days of discharge with Dr. Gordon  Will discuss with attending and advise if change  Please reconsult PRN

## 2017-09-02 NOTE — CONSULT NOTE ADULT - SUBJECTIVE AND OBJECTIVE BOX
HPI  89F complains of left thigh pain for 2 day status post fall from unknown cause.  Pt denies numbness, paresthesias, headstrike, loss of consciousness, or any other signs/symptoms at this time. Patient is a household ambulator without assistive devices at baseline.    Allergies: Sulfa  PMH: Microcytic Anemia, HTN, HLD, Glaucoma, Hypothyroidism  PSH: Denies  Social: Denies smoking  FH: Noncontributory  Imaging: XR/CT left femur - minimally displaced distal femur fracture with intraarticular extension    Physical exam  VS: Afebrile, vital signs stable  Gen: NAD  left LE: Skin intact. No erythema/ecchymosis/warmth. No TTP bony prominences at ankle/foot.+EHL/FHL/TA/GS. SILT L3-S1, +DP pulse, capillary refill brisk. Compartments soft and nontender.  Secondary survey: No TTP bony prominences with full painless AROM at baseline per patient. SILT. Capillary refill brisk. Able to SLR with contralateral leg. No TTP axial spine.

## 2017-09-02 NOTE — PROGRESS NOTE ADULT - PROBLEM SELECTOR PLAN 4
transfused 2 prbc  iron studies c/w iron def anemia ?blood loss source  GI eval/Dr. Chance larsen noted.  f/u post transfusion cbc  will possibly need  gi w/u (fobt in ed negative)

## 2017-09-02 NOTE — PROGRESS NOTE ADULT - ASSESSMENT
89 y/o female lives alone at home with A  7 days x 10 hours with PMH of falls, HTN, HLD, Glaucoma, Hypothyroidism, dementia  was brought in when HHA found him on the floor in am, Pt poor historian and states that she fell from bed and was on floor all night, but also had bed sheet and pillow on the floor as per roberto, CPK was normal,  Pt also c/o left knee pain, LLE weakness and ongoing swelling ongoing, also with  b/l knee pain (chronic as per daughter left >right), Pt denies being on any NSAIDs  In ED w/u c/w severe iron def/microcytic anemia, cpk wnl, CTH negative for intracranial pathology.    Improved    US negative for acute pathology     CT leg reviewed  acute leg fracture 89 y/o female lives alone at home with ALEXA  7 days x 10 hours with PMH of falls, HTN, HLD, Glaucoma, Hypothyroidism, dementia  was brought in when HHA found him on the floor in am, Pt poor historian and states that she fell from bed and was on floor all night, but also had bed sheet and pillow on the floor as per roberto, CPK was normal,  Pt also c/o left knee pain, LLE weakness and ongoing swelling ongoing, also with  b/l knee pain (chronic as per daughter left >right), Pt denies being on any NSAIDs  In ED w/u c/w severe iron def/microcytic anemia, cpk wnl, CTH negative for intracranial pathology.    Improved    US negative for acute pathology     CT leg reviewed  acute leg fracture  Consyult placed urgently to ORTho which has talked to the patient and offered repair vs cast   family is currently makeing choice     IF patient to go to OR patient is clear  however would transfuse 1 unit PRBC prior to OR

## 2017-09-03 LAB
ANION GAP SERPL CALC-SCNC: 8 MMOL/L — SIGNIFICANT CHANGE UP (ref 5–17)
BLD GP AB SCN SERPL QL: SIGNIFICANT CHANGE UP
BUN SERPL-MCNC: 6 MG/DL — LOW (ref 7–23)
CALCIUM SERPL-MCNC: 8.2 MG/DL — LOW (ref 8.5–10.1)
CHLORIDE SERPL-SCNC: 105 MMOL/L — SIGNIFICANT CHANGE UP (ref 96–108)
CO2 SERPL-SCNC: 28 MMOL/L — SIGNIFICANT CHANGE UP (ref 22–31)
CREAT SERPL-MCNC: 0.41 MG/DL — LOW (ref 0.5–1.3)
GLUCOSE SERPL-MCNC: 79 MG/DL — SIGNIFICANT CHANGE UP (ref 70–99)
HCT VFR BLD CALC: 25 % — LOW (ref 34.5–45)
HGB BLD-MCNC: 7.6 G/DL — LOW (ref 11.5–15.5)
MAGNESIUM SERPL-MCNC: 2.1 MG/DL — SIGNIFICANT CHANGE UP (ref 1.6–2.6)
MCHC RBC-ENTMCNC: 21 PG — LOW (ref 27–34)
MCHC RBC-ENTMCNC: 30.4 GM/DL — LOW (ref 32–36)
MCV RBC AUTO: 68.9 FL — LOW (ref 80–100)
PHOSPHATE SERPL-MCNC: 2.8 MG/DL — SIGNIFICANT CHANGE UP (ref 2.5–4.5)
PLATELET # BLD AUTO: 405 K/UL — HIGH (ref 150–400)
POTASSIUM SERPL-MCNC: 4.1 MMOL/L — SIGNIFICANT CHANGE UP (ref 3.5–5.3)
POTASSIUM SERPL-SCNC: 4.1 MMOL/L — SIGNIFICANT CHANGE UP (ref 3.5–5.3)
RBC # BLD: 3.62 M/UL — LOW (ref 3.8–5.2)
RBC # FLD: 23.7 % — HIGH (ref 11–15)
SODIUM SERPL-SCNC: 141 MMOL/L — SIGNIFICANT CHANGE UP (ref 135–145)
WBC # BLD: 8.3 K/UL — SIGNIFICANT CHANGE UP (ref 3.8–10.5)
WBC # FLD AUTO: 8.3 K/UL — SIGNIFICANT CHANGE UP (ref 3.8–10.5)

## 2017-09-03 PROCEDURE — 99233 SBSQ HOSP IP/OBS HIGH 50: CPT

## 2017-09-03 RX ADMIN — AMLODIPINE BESYLATE 10 MILLIGRAM(S): 2.5 TABLET ORAL at 06:30

## 2017-09-03 RX ADMIN — Medication 2000 UNIT(S): at 11:51

## 2017-09-03 RX ADMIN — Medication 100 MILLIGRAM(S): at 06:30

## 2017-09-03 RX ADMIN — Medication 1 TABLET(S): at 11:50

## 2017-09-03 RX ADMIN — Medication 325 MILLIGRAM(S): at 09:30

## 2017-09-03 RX ADMIN — Medication 81 MILLIGRAM(S): at 11:50

## 2017-09-03 RX ADMIN — Medication 88 MICROGRAM(S): at 06:30

## 2017-09-03 RX ADMIN — DORZOLAMIDE HYDROCHLORIDE TIMOLOL MALEATE 1 DROP(S): 20; 5 SOLUTION/ DROPS OPHTHALMIC at 17:17

## 2017-09-03 RX ADMIN — ATORVASTATIN CALCIUM 10 MILLIGRAM(S): 80 TABLET, FILM COATED ORAL at 21:40

## 2017-09-03 RX ADMIN — DORZOLAMIDE HYDROCHLORIDE TIMOLOL MALEATE 1 DROP(S): 20; 5 SOLUTION/ DROPS OPHTHALMIC at 06:30

## 2017-09-03 RX ADMIN — SENNA PLUS 2 TABLET(S): 8.6 TABLET ORAL at 21:40

## 2017-09-03 RX ADMIN — Medication 325 MILLIGRAM(S): at 17:17

## 2017-09-03 RX ADMIN — Medication 100 MILLIGRAM(S): at 17:17

## 2017-09-03 RX ADMIN — PANTOPRAZOLE SODIUM 40 MILLIGRAM(S): 20 TABLET, DELAYED RELEASE ORAL at 06:31

## 2017-09-03 NOTE — PROGRESS NOTE ADULT - ASSESSMENT
89 y/o female lives alone at home with HHA  7 days x 10 hours with PMH of falls, HTN, HLD, Glaucoma, Hypothyroidism, dementia  was brought in when HHA found him on the floor in am, Pt poor historian and states that she fell from bed and was on floor all night, but also had bed sheet and pillow on the floor as per roberto, CPK was normal,  Pt also c/o left knee pain, LLE weakness and ongoing swelling ongoing, also with  b/l knee pain (chronic as per daughter left >right), Pt denies being on any NSAIDs  In ED w/u c/w severe iron def/microcytic anemia, cpk wnl, CTH negative for intracranial pathology.    Improved    US negative for acute pathology     CT leg reviewed  acute leftleg fracture currently family  does not want surgery     Patient currently refuses colonoscopy or barium enema    Discharge planning to rehab   IF patient to go to OR patient is clear  however would transfuse 1 unit PRBC prior to OR

## 2017-09-03 NOTE — PHYSICAL THERAPY INITIAL EVALUATION ADULT - CRITERIA FOR SKILLED THERAPEUTIC INTERVENTIONS
risk reduction/prevention/anticipated discharge recommendation/impairments found/rehab potential/predicted duration of therapy intervention/functional limitations in following categories/therapy frequency

## 2017-09-03 NOTE — PHYSICAL THERAPY INITIAL EVALUATION ADULT - MODIFIED CLINICAL TEST OF SENSORY INTEGRATION IN BALANCE TEST
Barthel Index: Feeding Score 5_/10__, Bathing Score 0_/5__, Grooming Score _0/5__, Dressing Score 5_/10__, Bowels Score _10_/10_, Bladder Score _5/10__, Toilet Score 0_/10__, Transfers Score 0_/15__, Mobility Score _0_/15_, Stairs Score 0_/10__,     Total Score _15__/100

## 2017-09-03 NOTE — PHYSICAL THERAPY INITIAL EVALUATION ADULT - ACTIVE RANGE OF MOTION EXAMINATION, REHAB EVAL
deficits as listed below/B shoulder flexion 0-90 degrees and L hip and  Knee N/T due to pain and immobilization

## 2017-09-03 NOTE — PHYSICAL THERAPY INITIAL EVALUATION ADULT - GENERAL OBSERVATIONS, REHAB EVAL
Pt was seen in supine c L knee immobilizer donned, alert and oriented to name, place and situation. Pt is [place] but was able to communicted with the therapist throughout P.T. intervention. Pt c/o 10/10 pain at L knee and RNBita was informed.

## 2017-09-03 NOTE — PHYSICAL THERAPY INITIAL EVALUATION ADULT - PLANNED THERAPY INTERVENTIONS, PT EVAL
balance training/bed mobility training/gait training/postural re-education/ROM/strengthening/transfer training

## 2017-09-03 NOTE — PHYSICAL THERAPY INITIAL EVALUATION ADULT - ADDITIONAL COMMENTS
Pt used a Rolling Walker for ambulation prior to admission. Pt has home health aide 10 hours, 7 days to assist in ADL prior to admission.

## 2017-09-03 NOTE — PROGRESS NOTE ADULT - SUBJECTIVE AND OBJECTIVE BOX
Patient is a 89y old  Female who presents with a chief complaint of s/p unwitnessed fall (30 Aug 2017 16:34)      INTERVAL HPI/OVERNIGHT EVENTS: no acute eventys as of this writing family doesn't want any aggressive interventions     MEDICATIONS  (STANDING):  aspirin enteric coated 81 milliGRAM(s) Oral daily  atorvastatin 10 milliGRAM(s) Oral at bedtime  amLODIPine   Tablet 10 milliGRAM(s) Oral daily  dorzolamide 2%/timolol 0.5% Ophthalmic Solution 1 Drop(s) Both EYES two times a day  levothyroxine 88 MICROGram(s) Oral daily  multivitamin 1 Tablet(s) Oral daily  cholecalciferol 2000 Unit(s) Oral daily  ferrous    sulfate 325 milliGRAM(s) Oral two times a day with meals  docusate sodium 100 milliGRAM(s) Oral two times a day  senna 2 Tablet(s) Oral at bedtime  pantoprazole    Tablet 40 milliGRAM(s) Oral before breakfast    MEDICATIONS  (PRN):  acetaminophen   Tablet. 650 milliGRAM(s) Oral every 6 hours PRN Mild Pain (1 - 3)  magnesium hydroxide Suspension 30 milliLiter(s) Oral at bedtime PRN Constipation  acetaminophen   Tablet 650 milliGRAM(s) Oral every 6 hours PRN For Temp greater than 38.5 C (101.3 F)      Allergies    sulfa drugs (Other)    Intolerances        REVIEW OF SYSTEMS:  CONSTITUTIONAL: No fever, weight loss, or fatigue  EYES: No eye pain, visual disturbances, or discharge  ENMT:  No difficulty hearing, tinnitus, vertigo; No sinus or throat pain  NECK: No pain or stiffness  BREASTS: No pain, masses, or nipple discharge  RESPIRATORY: No cough, wheezing, chills or hemoptysis; No shortness of breath  CARDIOVASCULAR: No chest pain, palpitations, dizziness, or leg swelling  GASTROINTESTINAL: No abdominal or epigastric pain. No nausea, vomiting, or hematemesis; No diarrhea or constipation. No melena or hematochezia.  GENITOURINARY: No dysuria, frequency, hematuria, or incontinence  NEUROLOGICAL: No headaches, memory loss, loss of strength, numbness, or tremors  SKIN: No itching, burning, rashes, or lesions   LYMPH NODES: No enlarged glands  ENDOCRINE: No heat or cold intolerance; No hair loss  MUSCULOSKELETAL:leftt leg discomfort   PSYCHIATRIC: No depression, anxiety, mood swings, or difficulty sleeping  HEME/LYMPH: No easy bruising, or bleeding gums  ALLERGY AND IMMUNOLOGIC: No hives or eczema    Vital Signs Last 24 Hrs  T(C): 37.1 (03 Sep 2017 17:18), Max: 37.7 (02 Sep 2017 23:53)  T(F): 98.8 (03 Sep 2017 17:18), Max: 99.8 (02 Sep 2017 23:53)  HR: 50 (03 Sep 2017 11:30) (50 - 98)  BP: 118/55 (03 Sep 2017 17:18) (104/44 - 151/70)  BP(mean): --  RR: 17 (03 Sep 2017 17:18) (16 - 18)  SpO2: 98% (03 Sep 2017 17:18) (98% - 100%)    PHYSICAL EXAM:  GENERAL: NAD, well-groomed, well-developed  HEAD:  Atraumatic, Normocephalic  EYES: EOMI, PERRLA, conjunctiva and sclera clear  ENMT: No tonsillar erythema, exudates, or enlargement; Moist mucous membranes, Good dentition, No lesions  NECK: Supple, No JVD, Normal thyroid  NERVOUS SYSTEM:  Alert & Oriented X3, Good concentration; Motor Strength 5/5 B/L upper and lower extremities; DTRs 2+ intact and symmetric  CHEST/LUNG: Clear to percussion bilaterally; No rales, rhonchi, wheezing, or rubs  HEART: Regular rate and rhythm; No murmurs, rubs, or gallops  ABDOMEN: Soft, Nontender, Nondistended; Bowel sounds present  EXTREMITIES:  left leg in immobilizer   LYMPH: No lymphadenopathy noted  SKIN: No rashes or lesions    LABS:                        7.6    8.3   )-----------( 405      ( 03 Sep 2017 06:25 )             25.0     09-03    141  |  105  |  6<L>  ----------------------------<  79  4.1   |  28  |  0.41<L>    Ca    8.2<L>      03 Sep 2017 06:25  Phos  2.8     09-03  Mg     2.1     09-03    TPro  6.0  /  Alb  2.5<L>  /  TBili  2.0<H>  /  DBili  x   /  AST  18  /  ALT  13  /  AlkPhos  78  09-02        CAPILLARY BLOOD GLUCOSE          RADIOLOGY & ADDITIONAL TESTS:    Imaging Personally Reviewed:  [X ] YES  [ ] NO    Consultant(s) Notes Reviewed:  [X ] YES  [ ] NO    Care Discussed with Consultants/Other Providers [ X] YES  [ ] NO

## 2017-09-03 NOTE — PROGRESS NOTE ADULT - SUBJECTIVE AND OBJECTIVE BOX
Patient is a 89y old  Female who presents with a chief complaint of s/p unwitnessed fall (30 Aug 2017 16:34)      HPI:  89 yo female lives alone a 7 days 10 hours / pmh of falls / anemia microcytic / htn / hld / glaucoma hypothyroidism poor historian. pt was found on floor in am by a, as she was alone and is a poor historian events leading up to her being on floor unknown .pt c/o l eft knee pain lle weakness swelling ongoing b/l knee pain ros np cough ,no dysuria , no sore throat , occasional palpitation's , + tinnitus , no fever no abd pain no nausea no vomiting. head ct negative l knee film severe djd , cxr interstitial lung changes no opacity noted , hp pelvis film no fracture . er labs signigificant for hgb hct 4.3/15.7 pt receiving 2 units in er (30 Aug 2017 16:34)      INTERVAL HPI/OVERNIGHT EVENTS:  The patient denies melena, hematochezia, hematemesis, nausea, vomiting, abdominal pain, constipation, diarrhea, or change in bowel movements     MEDICATIONS  (STANDING):  aspirin enteric coated 81 milliGRAM(s) Oral daily  atorvastatin 10 milliGRAM(s) Oral at bedtime  amLODIPine   Tablet 10 milliGRAM(s) Oral daily  dorzolamide 2%/timolol 0.5% Ophthalmic Solution 1 Drop(s) Both EYES two times a day  levothyroxine 88 MICROGram(s) Oral daily  multivitamin 1 Tablet(s) Oral daily  cholecalciferol 2000 Unit(s) Oral daily  ferrous    sulfate 325 milliGRAM(s) Oral two times a day with meals  docusate sodium 100 milliGRAM(s) Oral two times a day  senna 2 Tablet(s) Oral at bedtime  pantoprazole    Tablet 40 milliGRAM(s) Oral before breakfast    MEDICATIONS  (PRN):  acetaminophen   Tablet. 650 milliGRAM(s) Oral every 6 hours PRN Mild Pain (1 - 3)  magnesium hydroxide Suspension 30 milliLiter(s) Oral at bedtime PRN Constipation  acetaminophen   Tablet 650 milliGRAM(s) Oral every 6 hours PRN For Temp greater than 38.5 C (101.3 F)      FAMILY HISTORY:  No pertinent family history in first degree relatives      Allergies    sulfa drugs (Other)    Intolerances        PMH/PSH:  Anemia due to unknown mechanism  HTN (hypertension)  HLD (hyperlipidemia)  Glaucoma  HTN (hypertension)  Hypothyroid  After cataract, bilateral  H/O thyroidectomy  Fracture of shoulder        REVIEW OF SYSTEMS:  CONFUSED  CONSTITUTIONAL: No fever, weight loss, or fatigue  EYES: No eye pain, visual disturbances, or discharge  ENMT:  No difficulty hearing, tinnitus, vertigo; No sinus or throat pain  NECK: No pain or stiffness  BREASTS: No pain, masses, or nipple discharge  RESPIRATORY: No cough, wheezing, chills or hemoptysis; No shortness of breath  CARDIOVASCULAR: No chest pain, palpitations, dizziness, or leg swelling  GASTROINTESTINAL: See above  GENITOURINARY: No dysuria, frequency, hematuria, or incontinence  NEUROLOGICAL: No headaches, memory loss, loss of strength, numbness, or tremors  SKIN: No itching, burning, rashes, or lesions   LYMPH NODES: No enlarged glands  ENDOCRINE: No heat or cold intolerance; No hair loss  MUSCULOSKELETAL: No joint pain or swelling; No muscle, back, or extremity pain  PSYCHIATRIC: No depression, anxiety, mood swings, or difficulty sleeping  HEME/LYMPH: No easy bruising, or bleeding gums  ALLERGY AND IMMUNOLOGIC: No hives or eczema    Vital Signs Last 24 Hrs  T(C): 37.1 (03 Sep 2017 11:30), Max: 37.7 (02 Sep 2017 23:53)  T(F): 98.8 (03 Sep 2017 11:30), Max: 99.8 (02 Sep 2017 23:53)  HR: 50 (03 Sep 2017 11:30) (50 - 98)  BP: 104/44 (03 Sep 2017 11:30) (104/44 - 151/70)  BP(mean): --  RR: 16 (03 Sep 2017 11:30) (16 - 18)  SpO2: 99% (03 Sep 2017 11:30) (98% - 100%)    PHYSICAL EXAM:  GENERAL: NAD, well-groomed, well-developed  HEAD:  Atraumatic, Normocephalic  EYES: EOMI, PERRLA, conjunctiva and sclera clear  NECK: Supple, No JVD, Normal thyroid  NERVOUS SYSTEM:  Alert but confused  CHEST/LUNG: Clear to percussion bilaterally; No rales, rhonchi, wheezing, or rubs  HEART: Regular rate and rhythm; No murmurs, rubs, or gallops  ABDOMEN: Soft, Nontender, Nondistended; Bowel sounds present  EXTREMITIES:  2+ Peripheral Pulses, No clubbing, cyanosis, or edema  LYMPH: No lymphadenopathy noted  SKIN: No rashes or lesions    LAB                          7.6    8.3   )-----------( 405      ( 03 Sep 2017 06:25 )             25.0       CBC:  09-03 @ 06:25  WBC 8.3   Hgb 7.6   Hct 25.0   Plts 405  MCV 68.9  09-02 @ 07:01  WBC 9.5   Hgb 7.5   Hct 24.9   Plts 366  MCV 70.8  08-31 @ 20:54  WBC 15.1   Hgb 7.3   Hct 23.3   Plts 347  MCV 67.4  08-30 @ 12:14  WBC 10.0   Hgb 4.3   Hct 15.7   Plts 374  MCV 62.7  08-30 @ 11:06  WBC 9.4   Hgb 4.6   Hct 16.7   Plts 397  MCV 62.2      Chemistry:  09-03 @ 06:25  Na+ 141  K+ 4.1  Cl- 105  CO2 28  BUN 6  Cr 0.41     09-02 @ 07:01  Na+ 140  K+ 3.9  Cl- 104  CO2 29  BUN 7  Cr 0.48     08-31 @ 20:54  Na+ 140  K+ 3.9  Cl- 105  CO2 27  BUN 9  Cr 0.46     08-30 @ 11:06  Na+ 143  K+ 4.2  Cl- 108  CO2 25  BUN 12  Cr 0.52         Glucose, Serum: 79 mg/dL (09-03 @ 06:25)  Glucose, Serum: 84 mg/dL (09-02 @ 07:01)  Glucose, Serum: 99 mg/dL (08-31 @ 20:54)  Glucose, Serum: 143 mg/dL (08-30 @ 11:06)      03 Sep 2017 06:25    141    |  105    |  6      ----------------------------<  79     4.1     |  28     |  0.41   02 Sep 2017 07:01    140    |  104    |  7      ----------------------------<  84     3.9     |  29     |  0.48   31 Aug 2017 20:54    140    |  105    |  9      ----------------------------<  99     3.9     |  27     |  0.46   30 Aug 2017 11:06    143    |  108    |  12     ----------------------------<  143    4.2     |  25     |  0.52     Ca    8.2        03 Sep 2017 06:25  Ca    7.7        02 Sep 2017 07:01  Ca    8.0        31 Aug 2017 20:54  Ca    8.5        30 Aug 2017 11:06  Phos  2.8       03 Sep 2017 06:25  Phos  2.4       02 Sep 2017 07:01  Mg     2.1       03 Sep 2017 06:25  Mg     2.2       02 Sep 2017 07:01  Mg     2.1       30 Aug 2017 11:06    TPro  6.0    /  Alb  2.5    /  TBili  2.0    /  DBili  x      /  AST  18     /  ALT  13     /  AlkPhos  78     02 Sep 2017 07:01  TPro  6.6    /  Alb  3.4    /  TBili  1.2    /  DBili  x      /  AST  17     /  ALT  11     /  AlkPhos  91     30 Aug 2017 11:06              CAPILLARY BLOOD GLUCOSE              RADIOLOGY & ADDITIONAL TESTS:    Imaging Personally Reviewed:  [ ] YES  [ ] NO    Consultant(s) Notes Reviewed:  [ ] YES  [ ] NO    Care Discussed with Consultants/Other Providers [ ] YES  [ ] NO

## 2017-09-03 NOTE — PHYSICAL THERAPY INITIAL EVALUATION ADULT - IMPAIRMENTS FOUND, PT EVAL
muscle strength/gait, locomotion, and balance/ergonomics and body mechanics/ROM/aerobic capacity/endurance

## 2017-09-04 PROCEDURE — 99232 SBSQ HOSP IP/OBS MODERATE 35: CPT

## 2017-09-04 RX ADMIN — Medication 100 MILLIGRAM(S): at 17:12

## 2017-09-04 RX ADMIN — DORZOLAMIDE HYDROCHLORIDE TIMOLOL MALEATE 1 DROP(S): 20; 5 SOLUTION/ DROPS OPHTHALMIC at 17:12

## 2017-09-04 RX ADMIN — SENNA PLUS 2 TABLET(S): 8.6 TABLET ORAL at 23:18

## 2017-09-04 RX ADMIN — Medication 325 MILLIGRAM(S): at 17:12

## 2017-09-04 RX ADMIN — Medication 2000 UNIT(S): at 12:10

## 2017-09-04 RX ADMIN — ATORVASTATIN CALCIUM 10 MILLIGRAM(S): 80 TABLET, FILM COATED ORAL at 23:18

## 2017-09-04 NOTE — PROGRESS NOTE ADULT - SUBJECTIVE AND OBJECTIVE BOX
CC/F/U for: Anemia    INTERVAL HPI/OVERNIGHT EVENTS:  Pt seen and examined at bedside.     Allergies/Intolerance: sulfa drugs (Other)      MEDICATIONS  (STANDING):  aspirin enteric coated 81 milliGRAM(s) Oral daily  atorvastatin 10 milliGRAM(s) Oral at bedtime  amLODIPine   Tablet 10 milliGRAM(s) Oral daily  dorzolamide 2%/timolol 0.5% Ophthalmic Solution 1 Drop(s) Both EYES two times a day  levothyroxine 88 MICROGram(s) Oral daily  multivitamin 1 Tablet(s) Oral daily  cholecalciferol 2000 Unit(s) Oral daily  ferrous    sulfate 325 milliGRAM(s) Oral two times a day with meals  docusate sodium 100 milliGRAM(s) Oral two times a day  senna 2 Tablet(s) Oral at bedtime  pantoprazole    Tablet 40 milliGRAM(s) Oral before breakfast    MEDICATIONS  (PRN):  acetaminophen   Tablet. 650 milliGRAM(s) Oral every 6 hours PRN Mild Pain (1 - 3)  magnesium hydroxide Suspension 30 milliLiter(s) Oral at bedtime PRN Constipation  acetaminophen   Tablet 650 milliGRAM(s) Oral every 6 hours PRN For Temp greater than 38.5 C (101.3 F)        ROS: all systems reviewed and wnl      PHYSICAL EXAMINATION:  Vital Signs Last 24 Hrs  T(C): 36.8 (04 Sep 2017 05:37), Max: 37.1 (03 Sep 2017 17:18)  T(F): 98.2 (04 Sep 2017 05:37), Max: 98.8 (03 Sep 2017 17:18)  HR: 91 (04 Sep 2017 05:37) (90 - 91)  BP: 132/63 (04 Sep 2017 05:37) (118/55 - 132/63)  BP(mean): --  RR: 17 (04 Sep 2017 05:37) (17 - 17)  SpO2: 100% (04 Sep 2017 05:37) (98% - 100%)  CAPILLARY BLOOD GLUCOSE          09-03 @ 07:01  -  09-04 @ 07:00  --------------------------------------------------------  IN: 750 mL / OUT: 650 mL / NET: 100 mL        GENERAL:   HEAD:    EYES:   ENMT:   NECK: supple, No JVD  CHEST/LUNG: clear to auscultation bilaterally; no rales, rhonchi, or wheezing b/l  HEART: normal S1, S2  ABDOMEN: BS+, soft, ND, NT   EXTREMITIES:  pulses palpable; no clubbing, cyanosis, or edema b/l LEs  NEURO: awake, alert, interactive; moves all extremities  SKIN: no rashes or lesions      LABS:                        7.6    8.3   )-----------( 405      ( 03 Sep 2017 06:25 )             25.0     09-03    141  |  105  |  6<L>  ----------------------------<  79  4.1   |  28  |  0.41<L>    Ca    8.2<L>      03 Sep 2017 06:25  Phos  2.8     09-03  Mg     2.1     09-03              RADIOLOGY & ADDITIONAL TESTS:      ASSESSMENT AND PLAN: CC/F/U for: Anemia    INTERVAL HPI/OVERNIGHT EVENTS:  Pt seen and examined at bedside.     Allergies/Intolerance: sulfa drugs (Other)      MEDICATIONS  (STANDING):  aspirin enteric coated 81 milliGRAM(s) Oral daily  atorvastatin 10 milliGRAM(s) Oral at bedtime  amLODIPine   Tablet 10 milliGRAM(s) Oral daily  dorzolamide 2%/timolol 0.5% Ophthalmic Solution 1 Drop(s) Both EYES two times a day  levothyroxine 88 MICROGram(s) Oral daily  multivitamin 1 Tablet(s) Oral daily  cholecalciferol 2000 Unit(s) Oral daily  ferrous    sulfate 325 milliGRAM(s) Oral two times a day with meals  docusate sodium 100 milliGRAM(s) Oral two times a day  senna 2 Tablet(s) Oral at bedtime  pantoprazole    Tablet 40 milliGRAM(s) Oral before breakfast    MEDICATIONS  (PRN):  acetaminophen   Tablet. 650 milliGRAM(s) Oral every 6 hours PRN Mild Pain (1 - 3)  magnesium hydroxide Suspension 30 milliLiter(s) Oral at bedtime PRN Constipation  acetaminophen   Tablet 650 milliGRAM(s) Oral every 6 hours PRN For Temp greater than 38.5 C (101.3 F)        ROS: all systems reviewed and wnl      PHYSICAL EXAMINATION:  Vital Signs Last 24 Hrs  T(C): 36.8 (04 Sep 2017 05:37), Max: 37.1 (03 Sep 2017 17:18)  T(F): 98.2 (04 Sep 2017 05:37), Max: 98.8 (03 Sep 2017 17:18)  HR: 91 (04 Sep 2017 05:37) (90 - 91)  BP: 132/63 (04 Sep 2017 05:37) (118/55 - 132/63)  BP(mean): --  RR: 17 (04 Sep 2017 05:37) (17 - 17)  SpO2: 100% (04 Sep 2017 05:37) (98% - 100%)  CAPILLARY BLOOD GLUCOSE          09-03 @ 07:01  -  09-04 @ 07:00  --------------------------------------------------------  IN: 750 mL / OUT: 650 mL / NET: 100 mL        GENERAL: stable in bed, NAD, eating lunch, comfortable, left knee brace in place.   NECK: supple, No JVD  CHEST/LUNG: clear to auscultation bilaterally; no rales, rhonchi, or wheezing b/l  HEART: normal S1, S2  ABDOMEN: BS+, soft, ND, NT   EXTREMITIES:  pulses palpable; no clubbing, cyanosis, or edema b/l LEs  NEURO: awake, alert, interactive; moves all extremities  SKIN: no rashes or lesions      LABS:                        7.6    8.3   )-----------( 405      ( 03 Sep 2017 06:25 )             25.0     09-03    141  |  105  |  6<L>  ----------------------------<  79  4.1   |  28  |  0.41<L>    Ca    8.2<L>      03 Sep 2017 06:25  Phos  2.8     09-03  Mg     2.1     09-03              RADIOLOGY & ADDITIONAL TESTS:      ASSESSMENT AND PLAN:

## 2017-09-04 NOTE — PROGRESS NOTE ADULT - ASSESSMENT
89 y/o female lives alone at home with A  7 days x 10 hours with PMH of falls, HTN, HLD, Glaucoma, Hypothyroidism, dementia  was brought in when HHA found him on the floor in am. Pt poor historian and states that she fell from bed and was on floor all night, but also had bed sheet and pillow on the floor as per roberto, CPK was normal,  Pt also c/o left knee pain, LLE weakness and ongoing swelling ongoing, also with  b/l knee pain (chronic as per daughter left >right), Pt denies being on any NSAIDs    In ED w/u c/w severe iron def/microcytic anemia, cpk wnl, CTH negative for intracranial pathology. Per ER discussion family does not want surgery and refuses endoscopy eval. 89 y/o female lives alone at home with A  7 days x 10 hours with PMH of falls, HTN, HLD, Glaucoma, Hypothyroidism, dementia  was brought in when HHA found him on the floor in am. Pt poor historian and states that she fell from bed and was on floor all night, but also had bed sheet and pillow on the floor as per roberto, CPK was normal,  Pt also c/o left knee pain, LLE weakness and ongoing swelling ongoing, also with  b/l knee pain (chronic as per daughter left >right), Pt denies being on any NSAIDs    In ED w/u c/w severe iron def/microcytic anemia, cpk wnl, CTH negative for intracranial pathology. Per ER discussion family does not want orthopedic surgery on left femur fracture and refuses endoscopy eval.

## 2017-09-05 ENCOUNTER — TRANSCRIPTION ENCOUNTER (OUTPATIENT)
Age: 82
End: 2017-09-05

## 2017-09-05 LAB
HCT VFR BLD CALC: 22.9 % — LOW (ref 34.5–45)
HGB BLD-MCNC: 6.9 G/DL — CRITICAL LOW (ref 11.5–15.5)
MCHC RBC-ENTMCNC: 20.7 PG — LOW (ref 27–34)
MCHC RBC-ENTMCNC: 29.9 GM/DL — LOW (ref 32–36)
MCV RBC AUTO: 69.2 FL — LOW (ref 80–100)
PLATELET # BLD AUTO: 414 K/UL — HIGH (ref 150–400)
RBC # BLD: 3.31 M/UL — LOW (ref 3.8–5.2)
RBC # FLD: 25.4 % — HIGH (ref 11–15)
WBC # BLD: 7.5 K/UL — SIGNIFICANT CHANGE UP (ref 3.8–10.5)
WBC # FLD AUTO: 7.5 K/UL — SIGNIFICANT CHANGE UP (ref 3.8–10.5)

## 2017-09-05 PROCEDURE — 99232 SBSQ HOSP IP/OBS MODERATE 35: CPT

## 2017-09-05 RX ORDER — FERROUS SULFATE 325(65) MG
1 TABLET ORAL
Qty: 60 | Refills: 0 | OUTPATIENT
Start: 2017-09-05 | End: 2017-10-05

## 2017-09-05 RX ORDER — SENNA PLUS 8.6 MG/1
2 TABLET ORAL
Qty: 0 | Refills: 0 | COMMUNITY
Start: 2017-09-05

## 2017-09-05 RX ORDER — DOCUSATE SODIUM 100 MG
1 CAPSULE ORAL
Qty: 0 | Refills: 0 | COMMUNITY
Start: 2017-09-05

## 2017-09-05 RX ADMIN — Medication 1 TABLET(S): at 12:27

## 2017-09-05 RX ADMIN — Medication 100 MILLIGRAM(S): at 18:18

## 2017-09-05 RX ADMIN — Medication 325 MILLIGRAM(S): at 18:18

## 2017-09-05 RX ADMIN — Medication 2000 UNIT(S): at 12:28

## 2017-09-05 RX ADMIN — DORZOLAMIDE HYDROCHLORIDE TIMOLOL MALEATE 1 DROP(S): 20; 5 SOLUTION/ DROPS OPHTHALMIC at 18:18

## 2017-09-05 NOTE — DISCHARGE NOTE ADULT - HOSPITAL COURSE
89 y/o female lives alone at home with HHA  7 days x 10 hours with PMH of falls, HTN, HLD, Glaucoma, Hypothyroidism, dementia  was brought in when HHA found him on the floor in am. Pt poor historian and states that she fell from bed and was on floor all night, but also had bed sheet and pillow on the floor as per roberto, CPK was normal,  Pt also c/o left knee pain, LLE weakness and ongoing swelling ongoing, also with  b/l knee pain (chronic as per daughter left >right), Pt denies being on any NSAIDs    In ED w/u c/w severe iron def/microcytic anemia, cpk wnl, CTH negative for intracranial pathology. Per ER discussion family does not want orthopedic surgery on left femur fracture and refuses endoscopy eval. Left knee brace in place. NWB to left leg.     Stable for discharge to rehab today. TOV in rehab.

## 2017-09-05 NOTE — PROGRESS NOTE ADULT - ASSESSMENT
89 y/o female lives alone at home with HHA  7 days x 10 hours with PMH of falls, HTN, HLD, Glaucoma, Hypothyroidism, dementia  was brought in when HHA found him on the floor in am. Pt poor historian and states that she fell from bed and was on floor all night, but also had bed sheet and pillow on the floor as per roberto, CPK was normal,  Pt also c/o left knee pain, LLE weakness and ongoing swelling ongoing, also with  b/l knee pain (chronic as per daughter left >right), Pt denies being on any NSAIDs    In ED w/u c/w severe iron def/microcytic anemia, cpk wnl, CTH negative for intracranial pathology. Per ER discussion family does not want orthopedic surgery on left femur fracture and refuses endoscopy eval. Left knee brace in place. NWB to left leg.     Stable for discharge to rehab today. 89 y/o female lives alone at home with HHA  7 days x 10 hours with PMH of falls, HTN, HLD, Glaucoma, Hypothyroidism, dementia  was brought in when HHA found him on the floor in am. Pt poor historian and states that she fell from bed and was on floor all night, but also had bed sheet and pillow on the floor as per roberto, CPK was normal,  Pt also c/o left knee pain, LLE weakness and ongoing swelling ongoing, also with  b/l knee pain (chronic as per daughter left >right), Pt denies being on any NSAIDs    In ED w/u c/w severe iron def/microcytic anemia, cpk wnl, CTH negative for intracranial pathology. Per ER discussion family does not want orthopedic surgery on left femur fracture and refuses endoscopy eval. Left knee brace in place. NWB to left leg.     Stable for discharge to rehab today. TOV in rehab.

## 2017-09-05 NOTE — DISCHARGE NOTE ADULT - PLAN OF CARE
Follow H/H CBC q3 days at rehab  Continue iron supplementation Continue home meds  Follow up with pmd Fall prevention

## 2017-09-05 NOTE — DISCHARGE NOTE ADULT - SECONDARY DIAGNOSIS.
Essential hypertension Gastroesophageal reflux disease without esophagitis Glaucoma of both eyes, unspecified glaucoma type Fracture of left lower extremity, closed, initial encounter Hyperlipidemia, unspecified hyperlipidemia type Fall, initial encounter

## 2017-09-05 NOTE — PROGRESS NOTE ADULT - SUBJECTIVE AND OBJECTIVE BOX
CC/F/U for: femur fracture and anemia    INTERVAL HPI/OVERNIGHT EVENTS:  Pt seen and examined at bedside.     Allergies/Intolerance: sulfa drugs (Other)      MEDICATIONS  (STANDING):  aspirin enteric coated 81 milliGRAM(s) Oral daily  atorvastatin 10 milliGRAM(s) Oral at bedtime  amLODIPine   Tablet 10 milliGRAM(s) Oral daily  dorzolamide 2%/timolol 0.5% Ophthalmic Solution 1 Drop(s) Both EYES two times a day  levothyroxine 88 MICROGram(s) Oral daily  multivitamin 1 Tablet(s) Oral daily  cholecalciferol 2000 Unit(s) Oral daily  ferrous    sulfate 325 milliGRAM(s) Oral two times a day with meals  docusate sodium 100 milliGRAM(s) Oral two times a day  senna 2 Tablet(s) Oral at bedtime  pantoprazole    Tablet 40 milliGRAM(s) Oral before breakfast    MEDICATIONS  (PRN):  acetaminophen   Tablet. 650 milliGRAM(s) Oral every 6 hours PRN Mild Pain (1 - 3)  magnesium hydroxide Suspension 30 milliLiter(s) Oral at bedtime PRN Constipation  acetaminophen   Tablet 650 milliGRAM(s) Oral every 6 hours PRN For Temp greater than 38.5 C (101.3 F)        ROS: all systems reviewed and wnl      PHYSICAL EXAMINATION:  Vital Signs Last 24 Hrs  T(C): 37.1 (05 Sep 2017 11:45), Max: 37.3 (04 Sep 2017 17:02)  T(F): 98.8 (05 Sep 2017 11:45), Max: 99.2 (04 Sep 2017 17:02)  HR: 84 (05 Sep 2017 11:45) (84 - 96)  BP: 122/48 (05 Sep 2017 11:45) (114/52 - 130/56)  BP(mean): --  RR: 16 (05 Sep 2017 11:45) (16 - 18)  SpO2: 99% (05 Sep 2017 11:45) (97% - 99%)  CAPILLARY BLOOD GLUCOSE          09-04 @ 07:01  -  09-05 @ 07:00  --------------------------------------------------------  IN: 0 mL / OUT: 600 mL / NET: -600 mL        GENERAL: stable in bed, NAD, no new complaints.    NECK: supple, No JVD  CHEST/LUNG: clear to auscultation bilaterally; no rales, rhonchi, or wheezing b/l  HEART: normal S1, S2  ABDOMEN: BS+, soft, ND, NT   EXTREMITIES:  pulses palpable; no clubbing, cyanosis, or edema b/l LEs  NEURO: awake, alert, interactive; moves all extremities  SKIN: no rashes or lesions      LABS:                  RADIOLOGY & ADDITIONAL TESTS:      ASSESSMENT AND PLAN: CC/F/U for: femur fracture and anemia    INTERVAL HPI/OVERNIGHT EVENTS:  Pt seen and examined at bedside.     Allergies/Intolerance: sulfa drugs (Other)      MEDICATIONS  (STANDING):  aspirin enteric coated 81 milliGRAM(s) Oral daily  atorvastatin 10 milliGRAM(s) Oral at bedtime  amLODIPine   Tablet 10 milliGRAM(s) Oral daily  dorzolamide 2%/timolol 0.5% Ophthalmic Solution 1 Drop(s) Both EYES two times a day  levothyroxine 88 MICROGram(s) Oral daily  multivitamin 1 Tablet(s) Oral daily  cholecalciferol 2000 Unit(s) Oral daily  ferrous    sulfate 325 milliGRAM(s) Oral two times a day with meals  docusate sodium 100 milliGRAM(s) Oral two times a day  senna 2 Tablet(s) Oral at bedtime  pantoprazole    Tablet 40 milliGRAM(s) Oral before breakfast    MEDICATIONS  (PRN):  acetaminophen   Tablet. 650 milliGRAM(s) Oral every 6 hours PRN Mild Pain (1 - 3)  magnesium hydroxide Suspension 30 milliLiter(s) Oral at bedtime PRN Constipation  acetaminophen   Tablet 650 milliGRAM(s) Oral every 6 hours PRN For Temp greater than 38.5 C (101.3 F)        ROS: all systems reviewed and wnl      PHYSICAL EXAMINATION:  Vital Signs Last 24 Hrs  T(C): 37.1 (05 Sep 2017 11:45), Max: 37.3 (04 Sep 2017 17:02)  T(F): 98.8 (05 Sep 2017 11:45), Max: 99.2 (04 Sep 2017 17:02)  HR: 84 (05 Sep 2017 11:45) (84 - 96)  BP: 122/48 (05 Sep 2017 11:45) (114/52 - 130/56)  BP(mean): --  RR: 16 (05 Sep 2017 11:45) (16 - 18)  SpO2: 99% (05 Sep 2017 11:45) (97% - 99%)  CAPILLARY BLOOD GLUCOSE          09-04 @ 07:01  -  09-05 @ 07:00  --------------------------------------------------------  IN: 0 mL / OUT: 600 mL / NET: -600 mL        GENERAL: stable in bed, NAD, no new complaints. Eating lunch.    NECK: supple, No JVD  CHEST/LUNG: clear to auscultation bilaterally; no rales, rhonchi, or wheezing b/l  HEART: normal S1, S2  ABDOMEN: BS+, soft, ND, NT   EXTREMITIES:  pulses palpable; no clubbing, cyanosis, or edema b/l LEs  NEURO: awake, alert, interactive; moves all extremities  SKIN: no rashes or lesions      LABS:                  RADIOLOGY & ADDITIONAL TESTS:      ASSESSMENT AND PLAN:

## 2017-09-05 NOTE — PROGRESS NOTE ADULT - PROBLEM SELECTOR PLAN 10
sequential compression device

## 2017-09-05 NOTE — DISCHARGE NOTE ADULT - MEDICATION SUMMARY - MEDICATIONS TO TAKE
I will START or STAY ON the medications listed below when I get home from the hospital:    1 vincent  -- 1 vincent for left lower extremity, initially locked at 30 degrees flexion    DX: S72.4  Ht: 160cm  Wt: 60kg  -- Indication: For Lower Extremity    Tylenol 325 mg oral capsule  -- 2 tab(s) by mouth every 8 hours, As Needed for pain control   -- Indication: For Pain    Milk of Magnesia 8% oral suspension  -- 15 milliliter(s) by mouth once a day (at bedtime), As Needed constipation  -- Indication: For Constipation    lovastatin 20 mg oral tablet  -- 1 tab(s) by mouth once a day  -- Indication: For HLD    amLODIPine 10 mg oral tablet  -- 1 tab(s) by mouth once a day  -- Indication: For HTN    docusate sodium 100 mg oral capsule  -- 1 cap(s) by mouth 2 times a day  -- Indication: For Constipation    senna oral tablet  -- 2 tab(s) by mouth once a day (at bedtime)  -- Indication: For Constipation    Cosopt 2.23%-0.68% ophthalmic solution  -- 1 drop(s) in each eye 2 times a day  -- Indication: For Eye    Lastacaft 0.25% ophthalmic solution  -- 1 drop(s) in each eye once a day (at bedtime)  -- Indication: For Eye    Protonix 40 mg oral granule, enteric coated  -- 1 each by mouth once a day  -- It is very important that you take or use this exactly as directed.  Do not skip doses or discontinue unless directed by your doctor.  Obtain medical advice before taking any non-prescription drugs as some may affect the action of this medication.    -- Indication: For GERD    levothyroxine 88 mcg (0.088 mg) oral tablet  -- 1 tab(s) by mouth once a day  -- Indication: For Hypothyroidism    multivitamin  -- 1 tab(s) by mouth once a day  -- Indication: For Supplement    Vitamin D3 2000 intl units oral capsule  -- 1 cap(s) by mouth once a day  -- Indication: For Supplement

## 2017-09-05 NOTE — DISCHARGE NOTE ADULT - MEDICATION SUMMARY - MEDICATIONS TO STOP TAKING
I will STOP taking the medications listed below when I get home from the hospital:    Aspirin Enteric Coated 81 mg oral delayed release tablet  -- 1 tab(s) by mouth once a day

## 2017-09-05 NOTE — DISCHARGE NOTE ADULT - CARE PLAN
Principal Discharge DX:	Anemia due to unknown mechanism  Goal:	Follow H/H  Instructions for follow-up, activity and diet:	CBC q3 days at rehab  Continue iron supplementation  Secondary Diagnosis:	Essential hypertension  Instructions for follow-up, activity and diet:	Continue home meds  Follow up with pmd  Secondary Diagnosis:	Gastroesophageal reflux disease without esophagitis  Instructions for follow-up, activity and diet:	Continue home meds  Follow up with pmd  Secondary Diagnosis:	Glaucoma of both eyes, unspecified glaucoma type  Instructions for follow-up, activity and diet:	Continue home meds  Follow up with pmd  Secondary Diagnosis:	Fracture of left lower extremity, closed, initial encounter  Instructions for follow-up, activity and diet:	Fall prevention  Secondary Diagnosis:	Hyperlipidemia, unspecified hyperlipidemia type  Instructions for follow-up, activity and diet:	Continue home meds  Follow up with pmd  Secondary Diagnosis:	Fall, initial encounter  Instructions for follow-up, activity and diet:	Fall prevention

## 2017-09-06 LAB
BLD GP AB SCN SERPL QL: SIGNIFICANT CHANGE UP
CULTURE RESULTS: SIGNIFICANT CHANGE UP
CULTURE RESULTS: SIGNIFICANT CHANGE UP
HCT VFR BLD CALC: 27.7 % — LOW (ref 34.5–45)
HGB BLD-MCNC: 8.5 G/DL — LOW (ref 11.5–15.5)
MCHC RBC-ENTMCNC: 23.1 PG — LOW (ref 27–34)
MCHC RBC-ENTMCNC: 30.6 GM/DL — LOW (ref 32–36)
MCV RBC AUTO: 75.3 FL — LOW (ref 80–100)
PLATELET # BLD AUTO: 395 K/UL — SIGNIFICANT CHANGE UP (ref 150–400)
RBC # BLD: 3.68 M/UL — LOW (ref 3.8–5.2)
RBC # FLD: 24.5 % — HIGH (ref 11–15)
SPECIMEN SOURCE: SIGNIFICANT CHANGE UP
SPECIMEN SOURCE: SIGNIFICANT CHANGE UP
WBC # BLD: 6.4 K/UL — SIGNIFICANT CHANGE UP (ref 3.8–10.5)
WBC # FLD AUTO: 6.4 K/UL — SIGNIFICANT CHANGE UP (ref 3.8–10.5)

## 2017-09-06 PROCEDURE — 99233 SBSQ HOSP IP/OBS HIGH 50: CPT

## 2017-09-06 RX ADMIN — Medication 1 TABLET(S): at 12:00

## 2017-09-06 RX ADMIN — DORZOLAMIDE HYDROCHLORIDE TIMOLOL MALEATE 1 DROP(S): 20; 5 SOLUTION/ DROPS OPHTHALMIC at 17:15

## 2017-09-06 RX ADMIN — Medication 325 MILLIGRAM(S): at 09:20

## 2017-09-06 RX ADMIN — DORZOLAMIDE HYDROCHLORIDE TIMOLOL MALEATE 1 DROP(S): 20; 5 SOLUTION/ DROPS OPHTHALMIC at 07:31

## 2017-09-06 RX ADMIN — AMLODIPINE BESYLATE 10 MILLIGRAM(S): 2.5 TABLET ORAL at 07:31

## 2017-09-06 RX ADMIN — Medication 88 MICROGRAM(S): at 07:31

## 2017-09-06 RX ADMIN — Medication 100 MILLIGRAM(S): at 07:31

## 2017-09-06 RX ADMIN — Medication 2000 UNIT(S): at 12:00

## 2017-09-06 RX ADMIN — Medication 100 MILLIGRAM(S): at 17:16

## 2017-09-06 RX ADMIN — Medication 325 MILLIGRAM(S): at 17:15

## 2017-09-06 RX ADMIN — PANTOPRAZOLE SODIUM 40 MILLIGRAM(S): 20 TABLET, DELAYED RELEASE ORAL at 07:31

## 2017-09-06 NOTE — DIETITIAN INITIAL EVALUATION ADULT. - NS AS NUTRI INTERV MEALS SNACK
Texture-modified diet/mech soft diet seems appropriate for pt although swallow eval may be warranted

## 2017-09-06 NOTE — PROGRESS NOTE ADULT - SUBJECTIVE AND OBJECTIVE BOX
Patient is a 89y old  Female who presents with a chief complaint of s/p unwitnessed fall (05 Sep 2017 12:15)      OVERNIGHT EVENTS: none    REVIEW OF SYSTEMS: denies chest pain/SOB, diaphoresis, no F/C, cough, dizziness, headache, blurry vision, nausea, vomiting, abdominal pain. All others review of systems negative     MEDICATIONS  (STANDING):  atorvastatin 10 milliGRAM(s) Oral at bedtime  amLODIPine   Tablet 10 milliGRAM(s) Oral daily  dorzolamide 2%/timolol 0.5% Ophthalmic Solution 1 Drop(s) Both EYES two times a day  levothyroxine 88 MICROGram(s) Oral daily  multivitamin 1 Tablet(s) Oral daily  cholecalciferol 2000 Unit(s) Oral daily  ferrous    sulfate 325 milliGRAM(s) Oral two times a day with meals  docusate sodium 100 milliGRAM(s) Oral two times a day  senna 2 Tablet(s) Oral at bedtime  pantoprazole    Tablet 40 milliGRAM(s) Oral before breakfast    MEDICATIONS  (PRN):  acetaminophen   Tablet. 650 milliGRAM(s) Oral every 6 hours PRN Mild Pain (1 - 3)  magnesium hydroxide Suspension 30 milliLiter(s) Oral at bedtime PRN Constipation  acetaminophen   Tablet 650 milliGRAM(s) Oral every 6 hours PRN For Temp greater than 38.5 C (101.3 F)      Allergies    sulfa drugs (Other)    Intolerances        T(F): 98.5 (09-06-17 @ 12:13), Max: 100.2 (09-05-17 @ 19:05)  HR: 88 (09-06-17 @ 12:13) (88 - 94)  BP: 108/64 (09-06-17 @ 12:13) (108/64 - 147/68)  RR: 18 (09-06-17 @ 12:13) (16 - 18)  SpO2: 100% (09-06-17 @ 12:13) (97% - 100%)  Wt(kg): --    PHYSICAL EXAM:  GENERAL: NAD, well-groomed, well-developed  HEAD:  Atraumatic, Normocephalic  EYES: EOMI, PERRLA, conjunctiva and sclera clear  ENMT: No tonsillar erythema, exudates, or enlargement; Moist mucous membranes, Good dentition, No lesions  NECK: Supple, No JVD, Normal thyroid  NERVOUS SYSTEM:  awake, Alert, Good concentration; Motor Strength 5/5 B/L upper and lower extremities; DTRs 2+ intact and symmetric  CHEST/LUNG: Clear to percussion bilaterally; No rales, rhonchi, wheezing, or rubs BL  HEART: Regular rate and rhythm; No murmurs, rubs, or gallops  ABDOMEN: Soft, Nontender, Nondistended; Bowel sounds present  EXTREMITIES:  2+ Peripheral Pulses, No clubbing, cyanosis, or edema BL LE  LYMPH: No lymphadenopathy noted  SKIN: No rashes or lesions    LABS:                        8.5    6.4   )-----------( 395      ( 06 Sep 2017 06:22 )             27.7         Cultures;   CAPILLARY BLOOD GLUCOSE        Lipid panel:           RADIOLOGY & ADDITIONAL TESTS:    Imaging Personally Reviewed:  [x ] YES      Consultant(s) Notes Reviewed:  [ x] YES     Care Discussed with [ x] Consultants [X ] Patient [ x] Family  [x ]    [x ]  Other; RN

## 2017-09-06 NOTE — DIETITIAN INITIAL EVALUATION ADULT. - ENERGY NEEDS
Height (cm): 153.67 (08-30)  Weight (kg): 62.6 (08-30)  BMI (kg/m2): 26.5 (08-30)  Ideal Body Weight: 46.8kg+/-10%  % Ideal Body Weight: 134%

## 2017-09-06 NOTE — PROGRESS NOTE ADULT - ASSESSMENT
89 y/o female lives alone at home with JENNIFER  7 days x 10 hours with PMH of falls, HTN, HLD, Glaucoma, Hypothyroidism, dementia  was brought in when HHA found him on the floor in am, Pt poor historian and states that she fell from bed and was on floor all night, but also had bed sheet and pillow on the floor as per roberto, CPK was normal,  Pt also c/o left knee pain, LLE weakness and ongoing swelling ongoing, also with  b/l knee pain (chronic as per daughter left >right), Pt denies being on any NSAIDs. In ED w/u c/w severe iron def/microcytic anemia, cpk wnl, CTH negative for intracranial pathology. US negative for acute pathology. CT leg reviewed  acute leftleg fracture currently family  does not want surgery. Patient currently refuses colonoscopy or barium enema.     Discharge planning to rehab

## 2017-09-06 NOTE — DIETITIAN INITIAL EVALUATION ADULT. - OTHER INFO
pt seen due to LOS. pt confused, sleeping and no family at bedside so unable to obtain diet, wt hx. as per CNA pt has small appetite, po intake 25-50%. No signs of malnutrition. No new wts since admission. Pt has full dentures, on [facility] soft diet. pt seen due to LOS. pt lives alone c HHA 7 days/10hrs. pt confused, sleeping and no family/HHA at bedside so unable to obtain diet, wt hx. as per CNA pt has small appetite, po intake 25-50%. No signs of malnutrition. No new wts since admission. Pt has full dentures, on [facility] soft diet.

## 2017-09-07 VITALS
OXYGEN SATURATION: 100 % | DIASTOLIC BLOOD PRESSURE: 68 MMHG | HEART RATE: 91 BPM | TEMPERATURE: 99 F | RESPIRATION RATE: 18 BRPM | SYSTOLIC BLOOD PRESSURE: 142 MMHG

## 2017-09-07 LAB
HCT VFR BLD CALC: 32.1 % — LOW (ref 34.5–45)
HGB BLD-MCNC: 10.3 G/DL — LOW (ref 11.5–15.5)
MCHC RBC-ENTMCNC: 24.2 PG — LOW (ref 27–34)
MCHC RBC-ENTMCNC: 32 GM/DL — SIGNIFICANT CHANGE UP (ref 32–36)
MCV RBC AUTO: 75.7 FL — LOW (ref 80–100)
PLATELET # BLD AUTO: 484 K/UL — HIGH (ref 150–400)
RBC # BLD: 4.24 M/UL — SIGNIFICANT CHANGE UP (ref 3.8–5.2)
RBC # FLD: 24.4 % — HIGH (ref 11–15)
WBC # BLD: 7.3 K/UL — SIGNIFICANT CHANGE UP (ref 3.8–10.5)
WBC # FLD AUTO: 7.3 K/UL — SIGNIFICANT CHANGE UP (ref 3.8–10.5)

## 2017-09-07 PROCEDURE — 99239 HOSP IP/OBS DSCHRG MGMT >30: CPT

## 2017-09-07 RX ADMIN — Medication 1 TABLET(S): at 11:59

## 2017-09-07 RX ADMIN — Medication 100 MILLIGRAM(S): at 06:11

## 2017-09-07 RX ADMIN — Medication 88 MICROGRAM(S): at 06:11

## 2017-09-07 RX ADMIN — Medication 2000 UNIT(S): at 15:47

## 2017-09-07 RX ADMIN — DORZOLAMIDE HYDROCHLORIDE TIMOLOL MALEATE 1 DROP(S): 20; 5 SOLUTION/ DROPS OPHTHALMIC at 06:11

## 2017-09-07 RX ADMIN — Medication 325 MILLIGRAM(S): at 08:03

## 2017-09-07 RX ADMIN — AMLODIPINE BESYLATE 10 MILLIGRAM(S): 2.5 TABLET ORAL at 06:11

## 2017-09-07 RX ADMIN — PANTOPRAZOLE SODIUM 40 MILLIGRAM(S): 20 TABLET, DELAYED RELEASE ORAL at 08:01

## 2017-09-07 NOTE — PROGRESS NOTE ADULT - PROBLEM SELECTOR PLAN 1
-follow up ortho
-follow up ortho
?fall  telemetry  PT eval  xray neg for FX  CTH neg for acute pathology
?fall  telemetry  PT eval  xray neg for FX  CTH neg for acute pathology
Hgb 7.6 after 2 units PRBC. Heme negative. Discussed with son and daughter yesterday for 20 minutes. I discussed the differential diagnosis and possible work up. Afetr a thorough discussion, the children do not want any work up. They understands the ramifications of their decision. They know to contact me if they change their mind. I will follow the patient ayah PRN basis. .
fall  telemetry  PT eval  xray neg for FX  CTY positive for fracture   CTH neg for acute pathology
Hgb 7.3 after 2 units PRBC. Heme negative. Daughter called at 11 pm last night. I discussed the diagnosis and work up in length. She wants to speak to her brother who is coming in from [place] today before making any decisions.

## 2017-09-07 NOTE — PROGRESS NOTE ADULT - SUBJECTIVE AND OBJECTIVE BOX
Patient is a 89y old  Female who presents with a chief complaint of s/p unwitnessed fall (05 Sep 2017 12:15)      OVERNIGHT EVENTS: no overnight event     REVIEW OF SYSTEMS: denies chest pain/SOB, diaphoresis, no F/C, cough, dizziness, headache, blurry vision, nausea, vomiting, abdominal pain. All others review of systems negative     MEDICATIONS  (STANDING):  atorvastatin 10 milliGRAM(s) Oral at bedtime  amLODIPine   Tablet 10 milliGRAM(s) Oral daily  dorzolamide 2%/timolol 0.5% Ophthalmic Solution 1 Drop(s) Both EYES two times a day  levothyroxine 88 MICROGram(s) Oral daily  multivitamin 1 Tablet(s) Oral daily  cholecalciferol 2000 Unit(s) Oral daily  ferrous    sulfate 325 milliGRAM(s) Oral two times a day with meals  docusate sodium 100 milliGRAM(s) Oral two times a day  senna 2 Tablet(s) Oral at bedtime  pantoprazole    Tablet 40 milliGRAM(s) Oral before breakfast    MEDICATIONS  (PRN):  acetaminophen   Tablet. 650 milliGRAM(s) Oral every 6 hours PRN Mild Pain (1 - 3)  magnesium hydroxide Suspension 30 milliLiter(s) Oral at bedtime PRN Constipation  acetaminophen   Tablet 650 milliGRAM(s) Oral every 6 hours PRN For Temp greater than 38.5 C (101.3 F)      Allergies    sulfa drugs (Other)    Intolerances        T(F): 98.2 (09-07-17 @ 12:49), Max: 99.8 (09-06-17 @ 20:19)  HR: 63 (09-07-17 @ 12:49) (63 - 92)  BP: 118/53 (09-07-17 @ 12:49) (111/50 - 141/56)  RR: 16 (09-07-17 @ 12:49) (16 - 18)  SpO2: 98% (09-07-17 @ 12:49) (98% - 98%)  Wt(kg): --    PHYSICAL EXAM:  GENERAL: NAD, well-groomed, well-developed  HEAD:  Atraumatic, Normocephalic  EYES: EOMI, PERRLA, conjunctiva and sclera clear  ENMT: No tonsillar erythema, exudates, or enlargement; Moist mucous membranes, Good dentition, No lesions  NECK: Supple, No JVD, Normal thyroid  NERVOUS SYSTEM:  awake, Alert, Good concentration; Motor Strength 5/5 B/L upper and lower extremities; DTRs 2+ intact and symmetric  CHEST/LUNG: Clear to percussion bilaterally; No rales, rhonchi, wheezing, or rubs BL  HEART: Regular rate and rhythm; No murmurs, rubs, or gallops  ABDOMEN: Soft, Nontender, Nondistended; Bowel sounds present  EXTREMITIES:  2+ Peripheral Pulses, No clubbing, cyanosis, or edema BL LE  LYMPH: No lymphadenopathy noted  SKIN: No rashes or lesions      LABS:                        10.3   7.3   )-----------( 484      ( 07 Sep 2017 07:33 )             32.1           RADIOLOGY & ADDITIONAL TESTS:    Imaging Personally Reviewed:  [x ] YES      Consultant(s) Notes Reviewed:  [x ] YES     Care Discussed with [ x] Consultants [X ] Patient [ ] Family  [x ]    [x ]  Other; RN

## 2017-09-07 NOTE — PROGRESS NOTE ADULT - PROVIDER SPECIALTY LIST ADULT
Gastroenterology
Gastroenterology
Hospitalist

## 2017-09-07 NOTE — PROGRESS NOTE ADULT - NSHPATTENDINGPLANDISCUSS_GEN_ALL_CORE
miri Taylor [phone], RN, Pt.
miri Taylor [phone]
miri Taylor [phone]
miri Taylor [phone], RN, Pt.
miri Taylor [phone]
miri Taylor [phone]

## 2017-09-07 NOTE — PROGRESS NOTE ADULT - PROBLEM SELECTOR PROBLEM 1
Fall, initial encounter
Fracture of left lower extremity, closed, initial encounter
Fracture of left lower extremity, closed, initial encounter
Iron deficiency anemia, unspecified iron deficiency anemia type
Iron deficiency anemia, unspecified iron deficiency anemia type

## 2017-09-09 DIAGNOSIS — F03.90 UNSPECIFIED DEMENTIA, UNSPECIFIED SEVERITY, WITHOUT BEHAVIORAL DISTURBANCE, PSYCHOTIC DISTURBANCE, MOOD DISTURBANCE, AND ANXIETY: ICD-10-CM

## 2017-09-09 DIAGNOSIS — D50.9 IRON DEFICIENCY ANEMIA, UNSPECIFIED: ICD-10-CM
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
## 2017-09-09 DIAGNOSIS — M17.12 UNILATERAL PRIMARY OSTEOARTHRITIS, LEFT KNEE: ICD-10-CM

## 2017-09-09 DIAGNOSIS — E89.0 POSTPROCEDURAL HYPOTHYROIDISM: ICD-10-CM

## 2017-09-09 DIAGNOSIS — D64.9 ANEMIA, UNSPECIFIED: ICD-10-CM

## 2017-09-09 DIAGNOSIS — E78.5 HYPERLIPIDEMIA, UNSPECIFIED: ICD-10-CM

## 2017-09-09 DIAGNOSIS — H40.9 UNSPECIFIED GLAUCOMA: ICD-10-CM

## 2017-09-09 DIAGNOSIS — Z79.82 LONG TERM (CURRENT) USE OF ASPIRIN: ICD-10-CM

## 2017-09-09 DIAGNOSIS — I10 ESSENTIAL (PRIMARY) HYPERTENSION: ICD-10-CM

## 2017-09-09 DIAGNOSIS — H26.40 UNSPECIFIED SECONDARY CATARACT: ICD-10-CM

## 2017-09-09 DIAGNOSIS — Z53.20 PROCEDURE AND TREATMENT NOT CARRIED OUT BECAUSE OF PATIENT'S DECISION FOR UNSPECIFIED REASONS: ICD-10-CM

## 2017-09-09 DIAGNOSIS — Z91.81 HISTORY OF FALLING: ICD-10-CM

## 2017-09-09 DIAGNOSIS — R60.0 LOCALIZED EDEMA: ICD-10-CM

## 2017-09-09 DIAGNOSIS — S72.492A OTHER FRACTURE OF LOWER END OF LEFT FEMUR, INITIAL ENCOUNTER FOR CLOSED FRACTURE: ICD-10-CM
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
## 2017-09-09 DIAGNOSIS — Z88.2 ALLERGY STATUS TO SULFONAMIDES: ICD-10-CM

## 2017-09-09 DIAGNOSIS — K21.9 GASTRO-ESOPHAGEAL REFLUX DISEASE WITHOUT ESOPHAGITIS: ICD-10-CM

## 2017-10-19 NOTE — H&P ADULT - ASSESSMENT
s/p fall at home found by aide oob on floor am/labs anemia microcytic/ pe lle pain swelling   multiple films -no acute fracture   cth- no acute changes
Essential hypertension

## 2018-02-16 ENCOUNTER — INPATIENT (INPATIENT)
Facility: HOSPITAL | Age: 83
LOS: 3 days | Discharge: HOME HEALTH | End: 2018-02-20
Attending: INTERNAL MEDICINE | Admitting: INTERNAL MEDICINE
Payer: MEDICARE

## 2018-02-16 VITALS
TEMPERATURE: 98 F | DIASTOLIC BLOOD PRESSURE: 62 MMHG | SYSTOLIC BLOOD PRESSURE: 128 MMHG | OXYGEN SATURATION: 98 % | RESPIRATION RATE: 16 BRPM | HEART RATE: 97 BPM

## 2018-02-16 DIAGNOSIS — S42.90XA FRACTURE OF UNSPECIFIED SHOULDER GIRDLE, PART UNSPECIFIED, INITIAL ENCOUNTER FOR CLOSED FRACTURE: Chronic | ICD-10-CM

## 2018-02-16 DIAGNOSIS — E89.0 POSTPROCEDURAL HYPOTHYROIDISM: Chronic | ICD-10-CM

## 2018-02-16 DIAGNOSIS — H26.40 UNSPECIFIED SECONDARY CATARACT: Chronic | ICD-10-CM

## 2018-02-16 LAB
ALBUMIN SERPL ELPH-MCNC: 2.9 G/DL — LOW (ref 3.3–5)
ALP SERPL-CCNC: 114 U/L — SIGNIFICANT CHANGE UP (ref 40–120)
ALT FLD-CCNC: 11 U/L — LOW (ref 12–78)
AMYLASE P1 CFR SERPL: 121 U/L — HIGH (ref 25–115)
ANION GAP SERPL CALC-SCNC: 7 MMOL/L — SIGNIFICANT CHANGE UP (ref 5–17)
AST SERPL-CCNC: 15 U/L — SIGNIFICANT CHANGE UP (ref 15–37)
BILIRUB DIRECT SERPL-MCNC: 0.15 MG/DL — SIGNIFICANT CHANGE UP (ref 0.05–0.2)
BILIRUB INDIRECT FLD-MCNC: 0.4 MG/DL — SIGNIFICANT CHANGE UP (ref 0.2–1)
BILIRUB SERPL-MCNC: 0.6 MG/DL — SIGNIFICANT CHANGE UP (ref 0.2–1.2)
BUN SERPL-MCNC: 14 MG/DL — SIGNIFICANT CHANGE UP (ref 7–23)
CALCIUM SERPL-MCNC: 8.4 MG/DL — LOW (ref 8.5–10.1)
CHLORIDE SERPL-SCNC: 112 MMOL/L — HIGH (ref 96–108)
CO2 SERPL-SCNC: 29 MMOL/L — SIGNIFICANT CHANGE UP (ref 22–31)
CREAT SERPL-MCNC: 0.56 MG/DL — SIGNIFICANT CHANGE UP (ref 0.5–1.3)
GLUCOSE SERPL-MCNC: 79 MG/DL — SIGNIFICANT CHANGE UP (ref 70–99)
HCT VFR BLD CALC: 28.5 % — LOW (ref 34.5–45)
HCT VFR BLD CALC: 29.7 % — LOW (ref 34.5–45)
HGB BLD-MCNC: 8.9 G/DL — LOW (ref 11.5–15.5)
HGB BLD-MCNC: 9.5 G/DL — LOW (ref 11.5–15.5)
LIDOCAIN IGE QN: 128 U/L — SIGNIFICANT CHANGE UP (ref 73–393)
MCHC RBC-ENTMCNC: 29.4 PG — SIGNIFICANT CHANGE UP (ref 27–34)
MCHC RBC-ENTMCNC: 32 GM/DL — SIGNIFICANT CHANGE UP (ref 32–36)
MCV RBC AUTO: 92 FL — SIGNIFICANT CHANGE UP (ref 80–100)
NRBC # BLD: 0 /100 WBCS — SIGNIFICANT CHANGE UP (ref 0–0)
OB PNL STL: POSITIVE
PLATELET # BLD AUTO: 224 K/UL — SIGNIFICANT CHANGE UP (ref 150–400)
POTASSIUM SERPL-MCNC: 3.8 MMOL/L — SIGNIFICANT CHANGE UP (ref 3.5–5.3)
POTASSIUM SERPL-SCNC: 3.8 MMOL/L — SIGNIFICANT CHANGE UP (ref 3.5–5.3)
PROT SERPL-MCNC: 6.3 GM/DL — SIGNIFICANT CHANGE UP (ref 6–8.3)
RBC # BLD: 3.23 M/UL — LOW (ref 3.8–5.2)
RBC # FLD: 15.2 % — HIGH (ref 10.3–14.5)
SODIUM SERPL-SCNC: 148 MMOL/L — HIGH (ref 135–145)
WBC # BLD: 5.36 K/UL — SIGNIFICANT CHANGE UP (ref 3.8–10.5)
WBC # FLD AUTO: 5.36 K/UL — SIGNIFICANT CHANGE UP (ref 3.8–10.5)

## 2018-02-16 PROCEDURE — 74177 CT ABD & PELVIS W/CONTRAST: CPT | Mod: 26

## 2018-02-16 PROCEDURE — 99285 EMERGENCY DEPT VISIT HI MDM: CPT

## 2018-02-16 RX ORDER — DORZOLAMIDE HYDROCHLORIDE TIMOLOL MALEATE 20; 5 MG/ML; MG/ML
1 SOLUTION/ DROPS OPHTHALMIC
Qty: 0 | Refills: 0 | Status: DISCONTINUED | OUTPATIENT
Start: 2018-02-16 | End: 2018-02-20

## 2018-02-16 RX ORDER — AMLODIPINE BESYLATE 2.5 MG/1
10 TABLET ORAL DAILY
Qty: 0 | Refills: 0 | Status: DISCONTINUED | OUTPATIENT
Start: 2018-02-16 | End: 2018-02-20

## 2018-02-16 RX ORDER — SODIUM CHLORIDE 9 MG/ML
1000 INJECTION INTRAMUSCULAR; INTRAVENOUS; SUBCUTANEOUS ONCE
Qty: 0 | Refills: 0 | Status: COMPLETED | OUTPATIENT
Start: 2018-02-16 | End: 2018-02-16

## 2018-02-16 RX ORDER — PANTOPRAZOLE SODIUM 20 MG/1
8 TABLET, DELAYED RELEASE ORAL
Qty: 80 | Refills: 0 | Status: DISCONTINUED | OUTPATIENT
Start: 2018-02-16 | End: 2018-02-18

## 2018-02-16 RX ORDER — SODIUM CHLORIDE 9 MG/ML
1000 INJECTION, SOLUTION INTRAVENOUS
Qty: 0 | Refills: 0 | Status: DISCONTINUED | OUTPATIENT
Start: 2018-02-16 | End: 2018-02-20

## 2018-02-16 RX ORDER — LEVOTHYROXINE SODIUM 125 MCG
88 TABLET ORAL DAILY
Qty: 0 | Refills: 0 | Status: DISCONTINUED | OUTPATIENT
Start: 2018-02-16 | End: 2018-02-20

## 2018-02-16 RX ORDER — PANTOPRAZOLE SODIUM 20 MG/1
80 TABLET, DELAYED RELEASE ORAL ONCE
Qty: 0 | Refills: 0 | Status: COMPLETED | OUTPATIENT
Start: 2018-02-16 | End: 2018-02-16

## 2018-02-16 RX ADMIN — SODIUM CHLORIDE 70 MILLILITER(S): 9 INJECTION, SOLUTION INTRAVENOUS at 19:37

## 2018-02-16 RX ADMIN — PANTOPRAZOLE SODIUM 10 MG/HR: 20 TABLET, DELAYED RELEASE ORAL at 19:37

## 2018-02-16 RX ADMIN — SODIUM CHLORIDE 1000 MILLILITER(S): 9 INJECTION INTRAMUSCULAR; INTRAVENOUS; SUBCUTANEOUS at 16:34

## 2018-02-16 RX ADMIN — PANTOPRAZOLE SODIUM 80 MILLIGRAM(S): 20 TABLET, DELAYED RELEASE ORAL at 20:13

## 2018-02-16 NOTE — H&P ADULT - NSHPPHYSICALEXAM_GEN_ALL_CORE
Constitutional: NAD, well-groomed, well-developed, obese+  HEENT: PERRLA, EOMI, Normal Hearing, MMM, hard of hearing  Neck: No LAD, No JVD  Back: Normal spine flexure, No CVA tenderness  Respiratory: CTAB/L   Cardiovascular: S1 and S2, RRR, no M/G/R  Gastrointestinal: BS+, soft, NT/ND, no palpable mass  Extremities: No peripheral edema  Vascular: 2+ peripheral pulses  Neurological: A/O x 3, no focal deficits  Skin: No rashes

## 2018-02-16 NOTE — H&P ADULT - NSHPLABSRESULTS_GEN_ALL_CORE
8.5    x     )-----------( x        ( 17 Feb 2018 10:17 )             27.3     02-16    148<H>  |  112<H>  |  14  ----------------------------<  79  3.8   |  29  |  0.56    Ca    8.4<L>      16 Feb 2018 16:10    TPro  6.3  /  Alb  2.9<L>  /  TBili  0.6  /  DBili  .15  /  AST  15  /  ALT  11<L>  /  AlkPhos  114  02-16          MICROBIOLOGY:  RECENT CULTURES:

## 2018-02-16 NOTE — H&P ADULT - HISTORY OF PRESENT ILLNESS
89 year old female presents today accompanied with her daughter and her home attendant c/o three day h/o diarrhea which appears maroon in color.  ! episode of diarrhea witnessed in ER  pt denies abdominal pain (-) nausea or vomiting (-) fevers or chills (-) weakness (-) chest pain or sob, pt denies recent antibiotic use (-) recent travel  Feels better

## 2018-02-16 NOTE — ED PROVIDER NOTE - OBJECTIVE STATEMENT
89 year old female presents today accompanied with her daughter and her home attendant c/o three day h/o diarrhea which appears maroon in color, pt denies abdominal pain (-) nausea or vomiting (-) fevers or chills (-) weakness (-) chest pain or sob, pt denies recent antibiotic use (-) recent travel

## 2018-02-17 DIAGNOSIS — H40.9 UNSPECIFIED GLAUCOMA: ICD-10-CM

## 2018-02-17 DIAGNOSIS — K92.2 GASTROINTESTINAL HEMORRHAGE, UNSPECIFIED: ICD-10-CM

## 2018-02-17 DIAGNOSIS — E03.8 OTHER SPECIFIED HYPOTHYROIDISM: ICD-10-CM

## 2018-02-17 DIAGNOSIS — E78.4 OTHER HYPERLIPIDEMIA: ICD-10-CM

## 2018-02-17 DIAGNOSIS — I10 ESSENTIAL (PRIMARY) HYPERTENSION: ICD-10-CM

## 2018-02-17 DIAGNOSIS — R19.7 DIARRHEA, UNSPECIFIED: ICD-10-CM

## 2018-02-17 LAB
HCT VFR BLD CALC: 27.2 % — LOW (ref 34.5–45)
HCT VFR BLD CALC: 27.3 % — LOW (ref 34.5–45)
HGB BLD-MCNC: 8.5 G/DL — LOW (ref 11.5–15.5)
HGB BLD-MCNC: 8.6 G/DL — LOW (ref 11.5–15.5)

## 2018-02-17 RX ADMIN — SODIUM CHLORIDE 70 MILLILITER(S): 9 INJECTION, SOLUTION INTRAVENOUS at 07:56

## 2018-02-17 RX ADMIN — AMLODIPINE BESYLATE 10 MILLIGRAM(S): 2.5 TABLET ORAL at 06:27

## 2018-02-17 RX ADMIN — PANTOPRAZOLE SODIUM 10 MG/HR: 20 TABLET, DELAYED RELEASE ORAL at 06:27

## 2018-02-17 RX ADMIN — Medication 88 MICROGRAM(S): at 06:27

## 2018-02-17 RX ADMIN — DORZOLAMIDE HYDROCHLORIDE TIMOLOL MALEATE 1 DROP(S): 20; 5 SOLUTION/ DROPS OPHTHALMIC at 17:05

## 2018-02-17 RX ADMIN — DORZOLAMIDE HYDROCHLORIDE TIMOLOL MALEATE 1 DROP(S): 20; 5 SOLUTION/ DROPS OPHTHALMIC at 06:27

## 2018-02-18 LAB
ALBUMIN SERPL ELPH-MCNC: 2.7 G/DL — LOW (ref 3.3–5)
ALP SERPL-CCNC: 107 U/L — SIGNIFICANT CHANGE UP (ref 40–120)
ALT FLD-CCNC: 8 U/L — LOW (ref 12–78)
ANION GAP SERPL CALC-SCNC: 6 MMOL/L — SIGNIFICANT CHANGE UP (ref 5–17)
AST SERPL-CCNC: 12 U/L — LOW (ref 15–37)
BILIRUB SERPL-MCNC: 0.9 MG/DL — SIGNIFICANT CHANGE UP (ref 0.2–1.2)
BUN SERPL-MCNC: 13 MG/DL — SIGNIFICANT CHANGE UP (ref 7–23)
CALCIUM SERPL-MCNC: 8.3 MG/DL — LOW (ref 8.5–10.1)
CHLORIDE SERPL-SCNC: 111 MMOL/L — HIGH (ref 96–108)
CO2 SERPL-SCNC: 29 MMOL/L — SIGNIFICANT CHANGE UP (ref 22–31)
CREAT SERPL-MCNC: 0.6 MG/DL — SIGNIFICANT CHANGE UP (ref 0.5–1.3)
GLUCOSE SERPL-MCNC: 80 MG/DL — SIGNIFICANT CHANGE UP (ref 70–99)
POTASSIUM SERPL-MCNC: 3.8 MMOL/L — SIGNIFICANT CHANGE UP (ref 3.5–5.3)
POTASSIUM SERPL-SCNC: 3.8 MMOL/L — SIGNIFICANT CHANGE UP (ref 3.5–5.3)
PROT SERPL-MCNC: 5.5 GM/DL — LOW (ref 6–8.3)
SODIUM SERPL-SCNC: 146 MMOL/L — HIGH (ref 135–145)

## 2018-02-18 RX ORDER — PANTOPRAZOLE SODIUM 20 MG/1
40 TABLET, DELAYED RELEASE ORAL
Qty: 0 | Refills: 0 | Status: DISCONTINUED | OUTPATIENT
Start: 2018-02-18 | End: 2018-02-20

## 2018-02-18 RX ADMIN — DORZOLAMIDE HYDROCHLORIDE TIMOLOL MALEATE 1 DROP(S): 20; 5 SOLUTION/ DROPS OPHTHALMIC at 05:38

## 2018-02-18 RX ADMIN — PANTOPRAZOLE SODIUM 40 MILLIGRAM(S): 20 TABLET, DELAYED RELEASE ORAL at 15:12

## 2018-02-18 RX ADMIN — DORZOLAMIDE HYDROCHLORIDE TIMOLOL MALEATE 1 DROP(S): 20; 5 SOLUTION/ DROPS OPHTHALMIC at 17:37

## 2018-02-18 RX ADMIN — AMLODIPINE BESYLATE 10 MILLIGRAM(S): 2.5 TABLET ORAL at 05:38

## 2018-02-18 RX ADMIN — Medication 88 MICROGRAM(S): at 05:38

## 2018-02-18 RX ADMIN — PANTOPRAZOLE SODIUM 10 MG/HR: 20 TABLET, DELAYED RELEASE ORAL at 01:45

## 2018-02-18 NOTE — PHYSICAL THERAPY INITIAL EVALUATION ADULT - ACTIVE RANGE OF MOTION EXAMINATION, REHAB EVAL
Right UE Active ROM was WFL (within functional limits)/Left UE Active ROM was WFL (within functional limits)/Right LE Active ROM was WFL (within functional limits)/Left LE Active ROM was WFL (within functional limits)

## 2018-02-18 NOTE — PHYSICAL THERAPY INITIAL EVALUATION ADULT - MODIFIED CLINICAL TEST OF SENSORY INTEGRATION IN BALANCE TEST
Barthel Index: Feeding Score 10, Bathing Score 0, Grooming Score 0, Dressing Score 5, Bowels Score 10, Bladder Score 10, Toilet Score 5, Transfers Score 10, Mobility Score 0, Stairs Score 0,     Total Score ___

## 2018-02-18 NOTE — PHYSICAL THERAPY INITIAL EVALUATION ADULT - GENERAL OBSERVATIONS, REHAB EVAL
Pt received supine in bed. Pt on room air. IV. Pt left seated at edge of bed, nursing assistant's care.

## 2018-02-19 LAB
HCT VFR BLD CALC: 27.2 % — LOW (ref 34.5–45)
HGB BLD-MCNC: 8.5 G/DL — LOW (ref 11.5–15.5)
MCHC RBC-ENTMCNC: 29.2 PG — SIGNIFICANT CHANGE UP (ref 27–34)
MCHC RBC-ENTMCNC: 31.3 GM/DL — LOW (ref 32–36)
MCV RBC AUTO: 93.5 FL — SIGNIFICANT CHANGE UP (ref 80–100)
NRBC # BLD: 0 /100 WBCS — SIGNIFICANT CHANGE UP (ref 0–0)
PLATELET # BLD AUTO: 240 K/UL — SIGNIFICANT CHANGE UP (ref 150–400)
RBC # BLD: 2.91 M/UL — LOW (ref 3.8–5.2)
RBC # FLD: 15.4 % — HIGH (ref 10.3–14.5)
WBC # BLD: 5.28 K/UL — SIGNIFICANT CHANGE UP (ref 3.8–10.5)
WBC # FLD AUTO: 5.28 K/UL — SIGNIFICANT CHANGE UP (ref 3.8–10.5)

## 2018-02-19 RX ADMIN — AMLODIPINE BESYLATE 10 MILLIGRAM(S): 2.5 TABLET ORAL at 06:13

## 2018-02-19 RX ADMIN — PANTOPRAZOLE SODIUM 40 MILLIGRAM(S): 20 TABLET, DELAYED RELEASE ORAL at 06:15

## 2018-02-19 RX ADMIN — DORZOLAMIDE HYDROCHLORIDE TIMOLOL MALEATE 1 DROP(S): 20; 5 SOLUTION/ DROPS OPHTHALMIC at 06:14

## 2018-02-19 RX ADMIN — DORZOLAMIDE HYDROCHLORIDE TIMOLOL MALEATE 1 DROP(S): 20; 5 SOLUTION/ DROPS OPHTHALMIC at 17:44

## 2018-02-19 RX ADMIN — Medication 88 MICROGRAM(S): at 06:15

## 2018-02-19 NOTE — PROGRESS NOTE ADULT - PROBLEM SELECTOR PLAN 6
unclear orin?Duodenal lesion  GI follow up  monitor HB
unclear origin?Duodenal lesion  GI follow up  monitor HB
unclear origin?Duodenal lesion  GI follow up  monitor HB

## 2018-02-19 NOTE — PROGRESS NOTE ADULT - PROBLEM SELECTOR PROBLEM 6
Gastrointestinal hemorrhage, unspecified gastrointestinal hemorrhage type

## 2018-02-20 ENCOUNTER — TRANSCRIPTION ENCOUNTER (OUTPATIENT)
Age: 83
End: 2018-02-20

## 2018-02-20 VITALS
SYSTOLIC BLOOD PRESSURE: 122 MMHG | DIASTOLIC BLOOD PRESSURE: 71 MMHG | HEART RATE: 90 BPM | OXYGEN SATURATION: 100 % | TEMPERATURE: 99 F | RESPIRATION RATE: 18 BRPM

## 2018-02-20 LAB
HCT VFR BLD CALC: 27.3 % — LOW (ref 34.5–45)
HGB BLD-MCNC: 8.4 G/DL — LOW (ref 11.5–15.5)
MCHC RBC-ENTMCNC: 28.6 PG — SIGNIFICANT CHANGE UP (ref 27–34)
MCHC RBC-ENTMCNC: 30.8 GM/DL — LOW (ref 32–36)
MCV RBC AUTO: 92.9 FL — SIGNIFICANT CHANGE UP (ref 80–100)
NRBC # BLD: 0 /100 WBCS — SIGNIFICANT CHANGE UP (ref 0–0)
PLATELET # BLD AUTO: 296 K/UL — SIGNIFICANT CHANGE UP (ref 150–400)
RBC # BLD: 2.94 M/UL — LOW (ref 3.8–5.2)
RBC # FLD: 15.5 % — HIGH (ref 10.3–14.5)
WBC # BLD: 5.06 K/UL — SIGNIFICANT CHANGE UP (ref 3.8–10.5)
WBC # FLD AUTO: 5.06 K/UL — SIGNIFICANT CHANGE UP (ref 3.8–10.5)

## 2018-02-20 RX ADMIN — DORZOLAMIDE HYDROCHLORIDE TIMOLOL MALEATE 1 DROP(S): 20; 5 SOLUTION/ DROPS OPHTHALMIC at 17:23

## 2018-02-20 RX ADMIN — Medication 88 MICROGRAM(S): at 06:52

## 2018-02-20 RX ADMIN — PANTOPRAZOLE SODIUM 40 MILLIGRAM(S): 20 TABLET, DELAYED RELEASE ORAL at 06:52

## 2018-02-20 RX ADMIN — AMLODIPINE BESYLATE 10 MILLIGRAM(S): 2.5 TABLET ORAL at 06:50

## 2018-02-20 RX ADMIN — DORZOLAMIDE HYDROCHLORIDE TIMOLOL MALEATE 1 DROP(S): 20; 5 SOLUTION/ DROPS OPHTHALMIC at 06:51

## 2018-02-20 NOTE — DISCHARGE NOTE ADULT - MEDICATION SUMMARY - MEDICATIONS TO TAKE
I will START or STAY ON the medications listed below when I get home from the hospital:    Tylenol 325 mg oral capsule  -- 2 tab(s) by mouth every 8 hours, As Needed for pain control   -- Indication: For pain    Aspir 81 oral delayed release tablet  -- 1 tab(s) by mouth once a day  -- Indication: For cad prophy    Milk of Magnesia 8% oral suspension  -- 15 milliliter(s) by mouth once a day (at bedtime), As Needed constipation  -- Indication: For constipation    lovastatin 20 mg oral tablet  -- 1 tab(s) by mouth once a day  -- Indication: For Other hyperlipidemia    amLODIPine 10 mg oral tablet  -- 1 tab(s) by mouth once a day  -- Indication: For Hypertension, unspecified type    ferrous sulfate 325 mg (65 mg elemental iron) oral tablet  -- 1 tab(s) by mouth 2 times a day  -- Indication: For supplement    docusate sodium 100 mg oral capsule  -- 1 cap(s) by mouth 2 times a day  -- Indication: For constipation    Cosopt 2.23%-0.68% ophthalmic solution  -- 1 drop(s) in each eye 2 times a day  -- Indication: For Glaucoma of both eyes, unspecified glaucoma type    Lastacaft 0.25% ophthalmic solution  -- 1 drop(s) in each eye once a day (at bedtime)  -- Indication: For Glaucoma of both eyes, unspecified glaucoma type    Protonix 40 mg oral granule, enteric coated  -- 1 each by mouth once a day  -- It is very important that you take or use this exactly as directed.  Do not skip doses or discontinue unless directed by your doctor.  Obtain medical advice before taking any non-prescription drugs as some may affect the action of this medication.    -- Indication: For Gastrointestinal hemorrhage, unspecified gastrointestinal hemorrhage type    levothyroxine 88 mcg (0.088 mg) oral tablet  -- 1 tab(s) by mouth once a day  -- Indication: For Other specified hypothyroidism    multivitamin  -- 1 tab(s) by mouth once a day  -- Indication: For supplement    Vitamin D3 2000 intl units oral capsule  -- 1 cap(s) by mouth once a day  -- Indication: For supplement

## 2018-02-20 NOTE — DISCHARGE NOTE ADULT - SECONDARY DIAGNOSIS.
Other specified hypothyroidism Other hyperlipidemia Essential hypertension Glaucoma of both eyes, unspecified glaucoma type Diarrhea, unspecified type

## 2018-02-20 NOTE — DISCHARGE NOTE ADULT - HOSPITAL COURSE
89 year old female presents today accompanied with her daughter and her home attendant c/o three day h/o diarrhea which appears maroon in color. episode of diarrhea witnessed in ER  pt denies abdominal pain (-) nausea or vomiting (-) fevers or chills (-) weakness (-) chest pain or sob, pt denies recent antibiotic use (-) recent travel  Feels better  after therapy with iv fluids  CT abd showed duodenitis/duodenal diverticulum  HB remaibned stable. No further work desired by patient  DC to home care

## 2018-02-20 NOTE — DISCHARGE NOTE ADULT - PATIENT PORTAL LINK FT
You can access the [facility] Patient Portal, offered by [facility], by registering with the following website: [email or web address]

## 2018-02-20 NOTE — DISCHARGE NOTE ADULT - CARE PLAN
Principal Discharge DX:	Gastrointestinal hemorrhage, unspecified gastrointestinal hemorrhage type  Goal:	resolve bleeding  Assessment and plan of treatment:	monitor hb, follow up with pmd  Secondary Diagnosis:	Other specified hypothyroidism  Assessment and plan of treatment:	routine meds  Secondary Diagnosis:	Other hyperlipidemia  Assessment and plan of treatment:	routine meds  Secondary Diagnosis:	Essential hypertension  Assessment and plan of treatment:	stable  Secondary Diagnosis:	Glaucoma of both eyes, unspecified glaucoma type  Assessment and plan of treatment:	routine meds  Secondary Diagnosis:	Diarrhea, unspecified type  Assessment and plan of treatment:	resolved.  reduce laxatives

## 2018-02-20 NOTE — PROGRESS NOTE ADULT - SUBJECTIVE AND OBJECTIVE BOX
Patient is a 89y old  Female who presents with a chief complaint of diarrhea (16 Feb 2018 22:24)      INTERVAL HPI/OVERNIGHT EVENTS:  still has loose stool  no abd pain, no vomiting    MEDICATIONS  (STANDING):  amLODIPine   Tablet 10 milliGRAM(s) Oral daily  dextrose 5% + sodium chloride 0.45%. 1000 milliLiter(s) (70 mL/Hr) IV Continuous <Continuous>  dorzolamide 2%/timolol 0.5% Ophthalmic Solution 1 Drop(s) Both EYES two times a day  levothyroxine 88 MICROGram(s) Oral daily  pantoprazole Infusion 8 mG/Hr (10 mL/Hr) IV Continuous <Continuous>    MEDICATIONS  (PRN):        REVIEW OF SYSTEMS:  CONSTITUTIONAL: No fever, weight loss, or fatigue  EYES: No eye pain, visual disturbances, or discharge  ENMT:  No difficulty hearing, tinnitus, vertigo; No sinus or throat pain  NECK: No pain or stiffness  RESPIRATORY: No cough, wheezing, chills or hemoptysis; No shortness of breath  CARDIOVASCULAR: No chest pain, palpitations, dizziness, or leg swelling  GASTROINTESTINAL: No abdominal or epigastric pain. No nausea, vomiting, or hematemesis; No diarrhea or constipation. No melena or hematochezia. not tender  GENITOURINARY: No dysuria, frequency, hematuria, or incontinence  NEUROLOGICAL: No headaches, memory loss, loss of strength, numbness, or tremors  SKIN: No itching, burning, rashes, or lesions      Vital Signs Last 24 Hrs  T(C): 36.6 (17 Feb 2018 12:33), Max: 37.1 (17 Feb 2018 05:45)  T(F): 97.8 (17 Feb 2018 12:33), Max: 98.8 (17 Feb 2018 05:45)  HR: 82 (17 Feb 2018 12:33) (80 - 99)  BP: 113/54 (17 Feb 2018 12:33) (113/54 - 148/81)  BP(mean): --  RR: 22 (17 Feb 2018 12:33) (16 - 22)  SpO2: 97% (17 Feb 2018 12:33) (95% - 99%)    PHYSICAL EXAM:  GENERAL: NAD, well-groomed, well-developed  HEAD:  Atraumatic, Normocephalic  EYES: EOMI, PERRLA, conjunctiva and sclera clear  ENMT: No tonsillar erythema, exudates, or enlargement; Moist mucous membranes   NECK: Supple, No JVD   NERVOUS SYSTEM:  Alert & Oriented X3, Good concentration; Motor Strength 5/5 B/L upper and lower extremities; DTRs 2+ intact and symmetric  CHEST/LUNG: Clear to percussion bilaterally; No rales, rhonchi, wheezing, or rubs  HEART: Regular rate and rhythm; No murmurs, rubs, or gallops  ABDOMEN: Soft, Nontender, Nondistended; Bowel sounds present  EXTREMITIES:  2+ Peripheral Pulses, No clubbing, cyanosis, or edema  LYMPH: No lymphadenopathy noted  SKIN: No rashes or lesions    LABS:                        8.5    x     )-----------( x        ( 17 Feb 2018 10:17 )             27.3     02-16    148<H>  |  112<H>  |  14  ----------------------------<  79  3.8   |  29  |  0.56    Ca    8.4<L>      16 Feb 2018 16:10    TPro  6.3  /  Alb  2.9<L>  /  TBili  0.6  /  DBili  .15  /  AST  15  /  ALT  11<L>  /  AlkPhos  114  02-16        CAPILLARY BLOOD GLUCOSE          RADIOLOGY & ADDITIONAL TESTS:    Imaging Personally Reviewed:  [ ] YES  [ ] NO    Consultant(s) Notes Reviewed:  [ ] YES  [ ] NO    Care Discussed with Consultants/Other Providers [ ] YES  [ ] NO
HPI:  89 year old female presents today accompanied with her daughter and her home attendant c/o three day h/o diarrhea which appears maroon in color.  ! episode of diarrhea witnessed in ER  pt denies abdominal pain (-) nausea or vomiting (-) fevers or chills (-) weakness (-) chest pain or sob, pt denies recent antibiotic use (-) recent travel  Feels better (16 Feb 2018 22:24)  No active bleeding. H/H stable. Pt and family refuse endoscopic evaluation    REVIEW OF SYSTEMS  General:	    Skin/Breast:  	  Ophthalmologic:  	  ENMT:	    Respiratory and Thorax: normal  	  Cardiovascular:	normal    Gastrointestinal:	normal    Genitourinary:	    Musculoskeletal:	    Neurological:	    Psychiatric:	    Hematology/Lymphatics:	    Endocrine:	    Allergic/Immunologic:	    MEDICATIONS  (STANDING):  amLODIPine   Tablet 10 milliGRAM(s) Oral daily  dextrose 5% + sodium chloride 0.45%. 1000 milliLiter(s) (70 mL/Hr) IV Continuous <Continuous>  dorzolamide 2%/timolol 0.5% Ophthalmic Solution 1 Drop(s) Both EYES two times a day  levothyroxine 88 MICROGram(s) Oral daily  pantoprazole    Tablet 40 milliGRAM(s) Oral before breakfast    MEDICATIONS  (PRN):      Vital Signs Last 24 Hrs  T(C): 36.2 (19 Feb 2018 17:10), Max: 36.8 (19 Feb 2018 11:00)  T(F): 97.1 (19 Feb 2018 17:10), Max: 98.3 (19 Feb 2018 11:00)  HR: 83 (19 Feb 2018 17:10) (79 - 85)  BP: 126/57 (19 Feb 2018 17:10) (109/44 - 127/57)  BP(mean): --  RR: 16 (19 Feb 2018 17:10) (16 - 17)  SpO2: 98% (19 Feb 2018 17:10) (96% - 99%)    PHYSICAL EXAM:      Constitutional:    Eyes:    ENMT:    Neck: supple    Breasts:    Back:    Respiratory: normal    Cardiovascular: normal    Gastrointestinal: No tenderness    Genitourinary:    Rectal:    Extremities:    Vascular:    Neurological:    Skin:    Lymph Nodes:    Musculoskeletal:    Psychiatric:            HEALTH ISSUES - PROBLEM Dx:  Other specified hypothyroidism: Other specified hypothyroidism  Other hyperlipidemia: Other hyperlipidemia  Glaucoma of both eyes, unspecified glaucoma type: Glaucoma of both eyes, unspecified glaucoma type  Hypertension, unspecified type: Hypertension, unspecified type  Diarrhea, unspecified type: Diarrhea, unspecified type  Gastrointestinal hemorrhage, unspecified gastrointestinal hemorrhage type: Gastrointestinal hemorrhage, unspecified gastrointestinal hemorrhage type            Assesment & Plan: PUD. supportive care
HPI:  89 year old female presents today accompanied with her daughter and her home attendant c/o three day h/o diarrhea which appears maroon in color.  ! episode of diarrhea witnessed in ER  pt denies abdominal pain (-) nausea or vomiting (-) fevers or chills (-) weakness (-) chest pain or sob, pt denies recent antibiotic use (-) recent travel  Feels better (16 Feb 2018 22:24). Pt resting in bed. She denies active bleeding.      REVIEW OF SYSTEMS      General:	    Skin/Breast:  	  Ophthalmologic:  	  ENMT:	    Respiratory and Thorax:normal  	  Cardiovascular:	normal    Gastrointestinal:	normal    Genitourinary:	    Musculoskeletal:	    Neurological:	    Psychiatric:	    Hematology/Lymphatics:	    Endocrine:	    Allergic/Immunologic:	    MEDICATIONS  (STANDING):  amLODIPine   Tablet 10 milliGRAM(s) Oral daily  dextrose 5% + sodium chloride 0.45%. 1000 milliLiter(s) (70 mL/Hr) IV Continuous <Continuous>  dorzolamide 2%/timolol 0.5% Ophthalmic Solution 1 Drop(s) Both EYES two times a day  levothyroxine 88 MICROGram(s) Oral daily  pantoprazole    Tablet 40 milliGRAM(s) Oral before breakfast    MEDICATIONS  (PRN):      Vital Signs Last 24 Hrs  T(C): 36.4 (20 Feb 2018 06:13), Max: 36.9 (20 Feb 2018 00:12)  T(F): 97.6 (20 Feb 2018 06:13), Max: 98.4 (20 Feb 2018 00:12)  HR: 81 (20 Feb 2018 06:13) (78 - 83)  BP: 114/53 (20 Feb 2018 06:13) (109/44 - 126/57)  BP(mean): --  RR: 18 (20 Feb 2018 06:13) (16 - 19)  SpO2: 100% (20 Feb 2018 06:13) (96% - 100%)    PHYSICAL EXAM:      Constitutional:    Eyes:    ENMT:    Neck: supple    Breasts:    Back:    Respiratory: normal    Cardiovascular: normal    Gastrointestinal: normal    Genitourinary:    Rectal:    Extremities:    Vascular:    Neurological:    Skin:    Lymph Nodes:    Musculoskeletal:    Psychiatric:            HEALTH ISSUES - PROBLEM Dx:  Other specified hypothyroidism: Other specified hypothyroidism  Other hyperlipidemia: Other hyperlipidemia  Glaucoma of both eyes, unspecified glaucoma type: Glaucoma of both eyes, unspecified glaucoma type  Hypertension, unspecified type: Hypertension, unspecified type  Diarrhea, unspecified type: Diarrhea, unspecified type  Gastrointestinal hemorrhage, unspecified gastrointestinal hemorrhage type: Gastrointestinal hemorrhage, unspecified gastrointestinal hemorrhage type            Assesment & Plan: GI bleeding. Monitor H/H
Patient is a 89y old  Female who presents with a chief complaint of diarrhea (16 Feb 2018 22:24)      INTERVAL HPI/OVERNIGHT EVENTS:  No bleed, no diarrhea  no further work up desired    MEDICATIONS  (STANDING):  amLODIPine   Tablet 10 milliGRAM(s) Oral daily  dextrose 5% + sodium chloride 0.45%. 1000 milliLiter(s) (70 mL/Hr) IV Continuous <Continuous>  dorzolamide 2%/timolol 0.5% Ophthalmic Solution 1 Drop(s) Both EYES two times a day  levothyroxine 88 MICROGram(s) Oral daily  pantoprazole    Tablet 40 milliGRAM(s) Oral before breakfast    MEDICATIONS  (PRN):        REVIEW OF SYSTEMS:  CONSTITUTIONAL: No fever, weight loss, or fatigue  EYES: No eye pain, visual disturbances, or discharge  ENMT:  No difficulty hearing, tinnitus, vertigo; No sinus or throat pain  NECK: No pain or stiffness  RESPIRATORY: No cough, wheezing, chills or hemoptysis; No shortness of breath  CARDIOVASCULAR: No chest pain, palpitations, dizziness, or leg swelling  GASTROINTESTINAL: No abdominal or epigastric pain. No nausea, vomiting, or hematemesis; No diarrhea or constipation. No melena or hematochezia.  GENITOURINARY: No dysuria, frequency, hematuria, or incontinence  NEUROLOGICAL: No headaches, memory loss, loss of strength, numbness, or tremors  SKIN: No itching, burning, rashes, or lesions      Vital Signs Last 24 Hrs  stable    PHYSICAL EXAM:  GENERAL: NAD, well-groomed, well-developed  HEAD:  Atraumatic, Normocephalic  EYES: EOMI, PERRLA, conjunctiva and sclera clear  ENMT: No tonsillar erythema, exudates, or enlargement; Moist mucous membranes   NECK: Supple, No JVD   NERVOUS SYSTEM:  Alert & Oriented X3, Good concentration; Motor Strength 5/5 B/L upper and lower extremities; DTRs 2+ intact and symmetric  CHEST/LUNG: Clear to percussion bilaterally; No rales, rhonchi, wheezing, or rubs  HEART: Regular rate and rhythm; No murmurs, rubs, or gallops  ABDOMEN: Soft, Nontender, Nondistended; Bowel sounds present  EXTREMITIES:  2+ Peripheral Pulses, No clubbing, cyanosis, or edema  LYMPH: No lymphadenopathy noted  SKIN: No rashes or lesions    LABS:                        8.5    5.28  )-----------( 240      ( 19 Feb 2018 07:37 )             27.2               CAPILLARY BLOOD GLUCOSE          RADIOLOGY & ADDITIONAL TESTS:    Imaging Personally Reviewed:  [ ] YES  [ ] NO    Consultant(s) Notes Reviewed:  [ ] YES  [ ] NO    Care Discussed with Consultants/Other Providers [ ] YES  [ ] NO
Patient is a 89y old  Female who presents with a chief complaint of diarrhea (16 Feb 2018 22:24)      INTERVAL HPI/OVERNIGHT EVENTS:  no more diarrhea  refusing heplock replacement  MEDICATIONS  (STANDING):  amLODIPine   Tablet 10 milliGRAM(s) Oral daily  dextrose 5% + sodium chloride 0.45%. 1000 milliLiter(s) (70 mL/Hr) IV Continuous <Continuous>  dorzolamide 2%/timolol 0.5% Ophthalmic Solution 1 Drop(s) Both EYES two times a day  levothyroxine 88 MICROGram(s) Oral daily  pantoprazole    Tablet 40 milliGRAM(s) Oral before breakfast    MEDICATIONS  (PRN):        REVIEW OF SYSTEMS:  CONSTITUTIONAL: No fever, weight loss, or fatigue  EYES: No eye pain, visual disturbances, or discharge  ENMT:  No difficulty hearing, tinnitus, vertigo; No sinus or throat pain  NECK: No pain or stiffness  RESPIRATORY: No cough, wheezing, chills or hemoptysis; No shortness of breath  CARDIOVASCULAR: No chest pain, palpitations, dizziness, or leg swelling  GASTROINTESTINAL: No abdominal or epigastric pain. No nausea, vomiting, or hematemesis; No diarrhea or constipation. No melena or hematochezia.  GENITOURINARY: No dysuria, frequency, hematuria, or incontinence  NEUROLOGICAL: No headaches, memory loss, loss of strength, numbness, or tremors  SKIN: No itching, burning, rashes, or lesions      Vital Signs Last 24 Hrs  T(C): 35.9 (18 Feb 2018 16:45), Max: 37 (18 Feb 2018 11:30)  T(F): 96.7 (18 Feb 2018 16:45), Max: 98.6 (18 Feb 2018 11:30)  HR: 92 (18 Feb 2018 16:45) (86 - 92)  BP: 118/59 (18 Feb 2018 16:45) (106/58 - 134/62)  BP(mean): --  RR: 18 (18 Feb 2018 16:45) (16 - 18)  SpO2: 98% (18 Feb 2018 16:45) (96% - 98%)    PHYSICAL EXAM:  GENERAL: NAD, well-groomed, well-developed  HEAD:  Atraumatic, Normocephalic  EYES: EOMI, PERRLA, conjunctiva and sclera clear  ENMT: No tonsillar erythema, exudates, or enlargement; Moist mucous membranes   NECK: Supple, No JVD   NERVOUS SYSTEM:  Alert & Oriented X3, Good concentration; Motor Strength 5/5 B/L upper and lower extremities; DTRs 2+ intact and symmetric  CHEST/LUNG: Clear to percussion bilaterally; No rales, rhonchi, wheezing, or rubs  HEART: Regular rate and rhythm; No murmurs, rubs, or gallops  ABDOMEN: Soft, Nontender, Nondistended; Bowel sounds present  EXTREMITIES:  2+ Peripheral Pulses, No clubbing, cyanosis, or edema  LYMPH: No lymphadenopathy noted  SKIN: No rashes or lesions    LABS:                        8.5    x     )-----------( x        ( 17 Feb 2018 10:17 )             27.3     02-18    146<H>  |  111<H>  |  13  ----------------------------<  80  3.8   |  29  |  0.60    Ca    8.3<L>      18 Feb 2018 08:25    TPro  5.5<L>  /  Alb  2.7<L>  /  TBili  0.9  /  DBili  x   /  AST  12<L>  /  ALT  8<L>  /  AlkPhos  107  02-18        CAPILLARY BLOOD GLUCOSE          RADIOLOGY & ADDITIONAL TESTS:    Imaging Personally Reviewed:  [ ] YES  [ ] NO    Consultant(s) Notes Reviewed:  [ ] YES  [ ] NO    Care Discussed with Consultants/Other Providers [ ] YES  [ ] NO

## 2018-02-26 DIAGNOSIS — R19.7 DIARRHEA, UNSPECIFIED: ICD-10-CM

## 2018-02-26 DIAGNOSIS — I10 ESSENTIAL (PRIMARY) HYPERTENSION: ICD-10-CM

## 2018-02-26 DIAGNOSIS — D64.89 OTHER SPECIFIED ANEMIAS: ICD-10-CM

## 2018-02-26 DIAGNOSIS — Z88.2 ALLERGY STATUS TO SULFONAMIDES: ICD-10-CM

## 2018-02-26 DIAGNOSIS — E78.5 HYPERLIPIDEMIA, UNSPECIFIED: ICD-10-CM

## 2018-02-26 DIAGNOSIS — H40.9 UNSPECIFIED GLAUCOMA: ICD-10-CM

## 2018-02-26 DIAGNOSIS — K29.81 DUODENITIS WITH BLEEDING: ICD-10-CM

## 2018-02-26 DIAGNOSIS — E03.9 HYPOTHYROIDISM, UNSPECIFIED: ICD-10-CM

## 2018-04-11 ENCOUNTER — EMERGENCY (EMERGENCY)
Facility: HOSPITAL | Age: 83
LOS: 0 days | Discharge: HOME | End: 2018-04-12
Attending: EMERGENCY MEDICINE
Payer: MEDICARE

## 2018-04-11 VITALS
RESPIRATION RATE: 16 BRPM | WEIGHT: 119.93 LBS | OXYGEN SATURATION: 98 % | HEART RATE: 101 BPM | DIASTOLIC BLOOD PRESSURE: 74 MMHG | TEMPERATURE: 99 F | SYSTOLIC BLOOD PRESSURE: 139 MMHG | HEIGHT: 61 IN

## 2018-04-11 DIAGNOSIS — E89.0 POSTPROCEDURAL HYPOTHYROIDISM: Chronic | ICD-10-CM

## 2018-04-11 DIAGNOSIS — H40.9 UNSPECIFIED GLAUCOMA: ICD-10-CM

## 2018-04-11 DIAGNOSIS — D64.9 ANEMIA, UNSPECIFIED: ICD-10-CM

## 2018-04-11 DIAGNOSIS — R41.82 ALTERED MENTAL STATUS, UNSPECIFIED: ICD-10-CM

## 2018-04-11 DIAGNOSIS — E78.5 HYPERLIPIDEMIA, UNSPECIFIED: ICD-10-CM

## 2018-04-11 DIAGNOSIS — I10 ESSENTIAL (PRIMARY) HYPERTENSION: ICD-10-CM

## 2018-04-11 DIAGNOSIS — E03.9 HYPOTHYROIDISM, UNSPECIFIED: ICD-10-CM

## 2018-04-11 DIAGNOSIS — H26.40 UNSPECIFIED SECONDARY CATARACT: Chronic | ICD-10-CM

## 2018-04-11 DIAGNOSIS — S42.90XA FRACTURE OF UNSPECIFIED SHOULDER GIRDLE, PART UNSPECIFIED, INITIAL ENCOUNTER FOR CLOSED FRACTURE: Chronic | ICD-10-CM

## 2018-04-11 DIAGNOSIS — Z00.00 ENCOUNTER FOR GENERAL ADULT MEDICAL EXAMINATION WITHOUT ABNORMAL FINDINGS: ICD-10-CM

## 2018-04-11 PROCEDURE — 99284 EMERGENCY DEPT VISIT MOD MDM: CPT

## 2018-04-11 RX ORDER — ASPIRIN/CALCIUM CARB/MAGNESIUM 324 MG
1 TABLET ORAL
Qty: 0 | Refills: 0 | COMMUNITY

## 2018-04-11 NOTE — ED PROVIDER NOTE - PROGRESS NOTE DETAILS
Social work visited pt, will try to reinstate aide for home tomorrow morning. Evans: Patient signed out by Dr. Denny pending SW for HHA reinstatement in AM. Patient in good condition overnight. Patient care transitioned to incoming team.  All decisions regarding the progression of care will be made at their discretion. Evans: Patient signed out by Dr. Denny pending SW for HHA reinstatement in AM. Labs and XR ordered. Patient in good condition overnight. Patient care transitioned to incoming team.  All decisions regarding the progression of care will be made at their discretion. Evans: Labs reviewed. Patient sitting cooperative eating breakfast. HHA reinstated and ambulette here to  patient. Patient now discharged with care instructions. Patient is to follow up with pmd. Discussed results and outcome of testing with the patient.  Patient advised to please follow up with their primary care doctor within the next 24 hours and return to the Emergency Department for worsening symptoms or any other concerns.  Patient advised that their doctor may call  to follow up on the specific results of the tests performed today in the emergency department.

## 2018-04-11 NOTE — ED PROVIDER NOTE - MEDICAL DECISION MAKING DETAILS
Ddx: ro underlying infection causing delirium though unlikely, seems more social work and dispo issue  Plan: cbc, cmp, ua, cxr, social work consult, reassess

## 2018-04-11 NOTE — ED ADULT TRIAGE NOTE - CHIEF COMPLAINT QUOTE
pt states she doesn't like home aid because shes nasty. pt A&O on arrival answering questions appropriately daughter told shes demented. Pt cooperative

## 2018-04-12 VITALS
OXYGEN SATURATION: 98 % | SYSTOLIC BLOOD PRESSURE: 104 MMHG | HEART RATE: 102 BPM | DIASTOLIC BLOOD PRESSURE: 50 MMHG | RESPIRATION RATE: 18 BRPM | TEMPERATURE: 98 F

## 2018-04-12 LAB
ALBUMIN SERPL ELPH-MCNC: 3.1 G/DL — LOW (ref 3.3–5)
ALP SERPL-CCNC: 131 U/L — HIGH (ref 40–120)
ALT FLD-CCNC: 12 U/L — SIGNIFICANT CHANGE UP (ref 12–78)
ANION GAP SERPL CALC-SCNC: 5 MMOL/L — SIGNIFICANT CHANGE UP (ref 5–17)
ANISOCYTOSIS BLD QL: SIGNIFICANT CHANGE UP
AST SERPL-CCNC: 9 U/L — LOW (ref 15–37)
BASOPHILS # BLD AUTO: 0.03 K/UL — SIGNIFICANT CHANGE UP (ref 0–0.2)
BASOPHILS NFR BLD AUTO: 1 % — SIGNIFICANT CHANGE UP (ref 0–2)
BILIRUB SERPL-MCNC: 1 MG/DL — SIGNIFICANT CHANGE UP (ref 0.2–1.2)
BUN SERPL-MCNC: 10 MG/DL — SIGNIFICANT CHANGE UP (ref 7–23)
CALCIUM SERPL-MCNC: 8.4 MG/DL — LOW (ref 8.5–10.1)
CHLORIDE SERPL-SCNC: 110 MMOL/L — HIGH (ref 96–108)
CO2 SERPL-SCNC: 28 MMOL/L — SIGNIFICANT CHANGE UP (ref 22–31)
CREAT SERPL-MCNC: 0.47 MG/DL — LOW (ref 0.5–1.3)
DACRYOCYTES BLD QL SMEAR: SLIGHT — SIGNIFICANT CHANGE UP
EOSINOPHIL # BLD AUTO: 0.06 K/UL — SIGNIFICANT CHANGE UP (ref 0–0.5)
EOSINOPHIL NFR BLD AUTO: 2 % — SIGNIFICANT CHANGE UP (ref 0–6)
GLUCOSE SERPL-MCNC: 76 MG/DL — SIGNIFICANT CHANGE UP (ref 70–99)
HCT VFR BLD CALC: 29.2 % — LOW (ref 34.5–45)
HGB BLD-MCNC: 8.4 G/DL — LOW (ref 11.5–15.5)
HYPOCHROMIA BLD QL: SIGNIFICANT CHANGE UP
LYMPHOCYTES # BLD AUTO: 0.88 K/UL — LOW (ref 1–3.3)
LYMPHOCYTES # BLD AUTO: 28 % — SIGNIFICANT CHANGE UP (ref 13–44)
LYMPHOCYTES # SPEC AUTO: 2 % — HIGH (ref 0–0)
MANUAL SMEAR VERIFICATION: SIGNIFICANT CHANGE UP
MCHC RBC-ENTMCNC: 22.1 PG — LOW (ref 27–34)
MCHC RBC-ENTMCNC: 28.8 GM/DL — LOW (ref 32–36)
MCV RBC AUTO: 76.8 FL — LOW (ref 80–100)
MICROCYTES BLD QL: SIGNIFICANT CHANGE UP
MONOCYTES # BLD AUTO: 0.19 K/UL — SIGNIFICANT CHANGE UP (ref 0–0.9)
MONOCYTES NFR BLD AUTO: 6 % — SIGNIFICANT CHANGE UP (ref 2–14)
NEUTROPHILS # BLD AUTO: 1.88 K/UL — SIGNIFICANT CHANGE UP (ref 1.8–7.4)
NEUTROPHILS NFR BLD AUTO: 60 % — SIGNIFICANT CHANGE UP (ref 43–77)
NRBC # BLD: 0 /100 — SIGNIFICANT CHANGE UP (ref 0–0)
NRBC # BLD: SIGNIFICANT CHANGE UP /100 WBCS (ref 0–0)
OVALOCYTES BLD QL SMEAR: SLIGHT — SIGNIFICANT CHANGE UP
PLAT MORPH BLD: NORMAL — SIGNIFICANT CHANGE UP
PLATELET # BLD AUTO: 375 K/UL — SIGNIFICANT CHANGE UP (ref 150–400)
POLYCHROMASIA BLD QL SMEAR: SLIGHT — SIGNIFICANT CHANGE UP
POTASSIUM SERPL-MCNC: 3.8 MMOL/L — SIGNIFICANT CHANGE UP (ref 3.5–5.3)
POTASSIUM SERPL-SCNC: 3.8 MMOL/L — SIGNIFICANT CHANGE UP (ref 3.5–5.3)
PROT SERPL-MCNC: 6.6 GM/DL — SIGNIFICANT CHANGE UP (ref 6–8.3)
RBC # BLD: 3.8 M/UL — SIGNIFICANT CHANGE UP (ref 3.8–5.2)
RBC # FLD: 19.9 % — HIGH (ref 10.3–14.5)
RBC BLD AUTO: ABNORMAL
SCHISTOCYTES BLD QL AUTO: SLIGHT — SIGNIFICANT CHANGE UP
SODIUM SERPL-SCNC: 143 MMOL/L — SIGNIFICANT CHANGE UP (ref 135–145)
VARIANT LYMPHS # BLD: 1 % — SIGNIFICANT CHANGE UP (ref 0–6)
WBC # BLD: 3.14 K/UL — LOW (ref 3.8–10.5)
WBC # FLD AUTO: 3.14 K/UL — LOW (ref 3.8–10.5)

## 2018-04-12 PROCEDURE — 71045 X-RAY EXAM CHEST 1 VIEW: CPT | Mod: 26

## 2018-04-12 NOTE — ED ADULT NURSE REASSESSMENT NOTE - NS ED NURSE REASSESS COMMENT FT1
pt awake, alert, no c/o at this time. bp 104/50, hr- 102, md coyne made aware. pt ok to d/c home to [facility] at this time. to

## 2018-05-16 NOTE — PROGRESS NOTE ADULT - PROBLEM/PLAN-3
DISPLAY PLAN FREE TEXT
Eye Protection Verbiage: Before proceeding with the stage, a plastic scleral shield was inserted. The globe was anesthetized with a few drops of 1% lidocaine with 1:100,000 epinephrine. Then, an appropriate sized scleral shield was chosen and coated with lacrilube ointment. The shield was gently inserted and left in place for the duration of each stage. After the stage was completed, the shield was gently removed.

## 2020-01-30 NOTE — PROGRESS NOTE ADULT - PROBLEM SELECTOR PLAN 3
Has been losing weight   transfused 2 prbc  iron studies c/w iron def anemia ?blood loss source  GI eval/Dr. Chance larsen noted.  f/u post transfusion cbc  will possibly need  gi w/u (fobt in ed negative)

## 2020-02-17 NOTE — PATIENT PROFILE ADULT. - NUTRITION PROFILE
Group Topic: [id number] [facility] Education    Date: 2/17/2020  Start Time:  1:00 PM  End Time:  1:50 PM  Facilitators: Chris Ledezma LPC    Focus: What is Psychotherapy Part 2  Number in attendance: 9    Educational group served as a continuation of an earlier session on the different facets of psychotherapy. Attendance: Present  Patient Response: Good eye contact, Interested in topic and Interactive    Remained attentive during session. Asked appropriate questions and shared what resonated during session.      Alex Goodrich [facility] no indicators present

## 2020-09-03 NOTE — PROGRESS NOTE ADULT - PROBLEM/PLAN-9
Low fiber diet  
DISPLAY PLAN FREE TEXT

## 2022-03-10 NOTE — ED ADULT TRIAGE NOTE - NS ED NURSE BANDS TYPE
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  Proceed with physical therapy as previously discussed.   Name band;

## 2023-06-21 NOTE — PHYSICAL THERAPY INITIAL EVALUATION ADULT - THERAPY FREQUENCY, PT EVAL
Rec'd call from pt, she stated that she is still having pain in her right knee. She stated that the pain is going up to her hip due to the pressure of not being able to use her knee. She would like to know if she would need an XR or another scan. Please advise. Thank you.  
5-7x/week

## 2025-06-12 NOTE — PATIENT PROFILE ADULT. - AS SC BRADEN MOISTURE
Pt requests a 90 supply of medication. SSZ 90 supply has been sent to the pt's pharmacy.   
(3) occasionally moist